# Patient Record
Sex: FEMALE | Race: BLACK OR AFRICAN AMERICAN | Employment: UNEMPLOYED | URBAN - METROPOLITAN AREA
[De-identification: names, ages, dates, MRNs, and addresses within clinical notes are randomized per-mention and may not be internally consistent; named-entity substitution may affect disease eponyms.]

---

## 2017-12-09 ENCOUNTER — HOSPITAL ENCOUNTER (OUTPATIENT)
Dept: MRI IMAGING | Age: 52
Discharge: HOME | End: 2017-12-09
Attending: SPECIALIST
Payer: MEDICAID

## 2017-12-09 DIAGNOSIS — D73.89 SPLENIC SEQUESTRATION: ICD-10-CM

## 2017-12-09 DIAGNOSIS — Z90.89: ICD-10-CM

## 2017-12-09 PROCEDURE — 74011250636 HC RX REV CODE- 250/636: Performed by: SPECIALIST

## 2017-12-09 PROCEDURE — 74183 MRI ABD W/O CNTR FLWD CNTR: CPT

## 2017-12-09 PROCEDURE — A9585 GADOBUTROL INJECTION: HCPCS | Performed by: SPECIALIST

## 2017-12-09 RX ADMIN — GADOBUTROL 7.5 ML: 604.72 INJECTION INTRAVENOUS at 10:28

## 2018-03-14 ENCOUNTER — HOSPITAL ENCOUNTER (OUTPATIENT)
Dept: LAB | Age: 53
Discharge: HOME | End: 2018-03-14
Payer: COMMERCIAL

## 2018-03-14 ENCOUNTER — OFFICE VISIT (OUTPATIENT)
Dept: HEMATOLOGY | Age: 53
End: 2018-03-14

## 2018-03-14 VITALS
DIASTOLIC BLOOD PRESSURE: 77 MMHG | TEMPERATURE: 98 F | BODY MASS INDEX: 32.14 KG/M2 | HEART RATE: 82 BPM | HEIGHT: 66 IN | WEIGHT: 200 LBS | SYSTOLIC BLOOD PRESSURE: 120 MMHG | RESPIRATION RATE: 16 BRPM | OXYGEN SATURATION: 99 %

## 2018-03-14 DIAGNOSIS — K76.89 LIVER CYST: ICD-10-CM

## 2018-03-14 DIAGNOSIS — K76.89 LIVER CYST: Primary | ICD-10-CM

## 2018-03-14 LAB
ALBUMIN SERPL-MCNC: 3.5 G/DL (ref 3.4–5)
ALBUMIN/GLOB SERPL: 0.9 {RATIO} (ref 0.8–1.7)
ALP SERPL-CCNC: 104 U/L (ref 45–117)
ALT SERPL-CCNC: 19 U/L (ref 13–56)
ANION GAP SERPL CALC-SCNC: 8 MMOL/L (ref 3–18)
AST SERPL-CCNC: 15 U/L (ref 15–37)
BASOPHILS # BLD: 0.1 K/UL (ref 0–0.06)
BASOPHILS NFR BLD: 1 % (ref 0–2)
BILIRUB DIRECT SERPL-MCNC: 0.1 MG/DL (ref 0–0.2)
BILIRUB SERPL-MCNC: 0.3 MG/DL (ref 0.2–1)
BUN SERPL-MCNC: 11 MG/DL (ref 7–18)
BUN/CREAT SERPL: 13 (ref 12–20)
CALCIUM SERPL-MCNC: 9.2 MG/DL (ref 8.5–10.1)
CHLORIDE SERPL-SCNC: 107 MMOL/L (ref 100–108)
CO2 SERPL-SCNC: 30 MMOL/L (ref 21–32)
CREAT SERPL-MCNC: 0.86 MG/DL (ref 0.6–1.3)
DIFFERENTIAL METHOD BLD: ABNORMAL
EOSINOPHIL # BLD: 0.1 K/UL (ref 0–0.4)
EOSINOPHIL NFR BLD: 1 % (ref 0–5)
ERYTHROCYTE [DISTWIDTH] IN BLOOD BY AUTOMATED COUNT: 13.5 % (ref 11.6–14.5)
GLOBULIN SER CALC-MCNC: 3.8 G/DL (ref 2–4)
GLUCOSE SERPL-MCNC: 93 MG/DL (ref 74–99)
HCT VFR BLD AUTO: 40.3 % (ref 35–45)
HGB BLD-MCNC: 12.9 G/DL (ref 12–16)
INR PPP: 1 (ref 0.8–1.2)
LYMPHOCYTES # BLD: 2.9 K/UL (ref 0.9–3.6)
LYMPHOCYTES NFR BLD: 36 % (ref 21–52)
MCH RBC QN AUTO: 28.3 PG (ref 24–34)
MCHC RBC AUTO-ENTMCNC: 32 G/DL (ref 31–37)
MCV RBC AUTO: 88.4 FL (ref 74–97)
MONOCYTES # BLD: 0.3 K/UL (ref 0.05–1.2)
MONOCYTES NFR BLD: 4 % (ref 3–10)
NEUTS SEG # BLD: 4.5 K/UL (ref 1.8–8)
NEUTS SEG NFR BLD: 58 % (ref 40–73)
PLATELET # BLD AUTO: 278 K/UL (ref 135–420)
PMV BLD AUTO: 9.7 FL (ref 9.2–11.8)
POTASSIUM SERPL-SCNC: 4.4 MMOL/L (ref 3.5–5.5)
PROT SERPL-MCNC: 7.3 G/DL (ref 6.4–8.2)
PROTHROMBIN TIME: 12.6 SEC (ref 11.5–15.2)
RBC # BLD AUTO: 4.56 M/UL (ref 4.2–5.3)
SODIUM SERPL-SCNC: 145 MMOL/L (ref 136–145)
WBC # BLD AUTO: 7.8 K/UL (ref 4.6–13.2)

## 2018-03-14 PROCEDURE — 87340 HEPATITIS B SURFACE AG IA: CPT | Performed by: INTERNAL MEDICINE

## 2018-03-14 PROCEDURE — 86706 HEP B SURFACE ANTIBODY: CPT | Performed by: INTERNAL MEDICINE

## 2018-03-14 PROCEDURE — 80076 HEPATIC FUNCTION PANEL: CPT | Performed by: INTERNAL MEDICINE

## 2018-03-14 PROCEDURE — 36415 COLL VENOUS BLD VENIPUNCTURE: CPT | Performed by: INTERNAL MEDICINE

## 2018-03-14 PROCEDURE — 85025 COMPLETE CBC W/AUTO DIFF WBC: CPT | Performed by: INTERNAL MEDICINE

## 2018-03-14 PROCEDURE — 85610 PROTHROMBIN TIME: CPT | Performed by: INTERNAL MEDICINE

## 2018-03-14 PROCEDURE — 86708 HEPATITIS A ANTIBODY: CPT | Performed by: INTERNAL MEDICINE

## 2018-03-14 PROCEDURE — 80048 BASIC METABOLIC PNL TOTAL CA: CPT | Performed by: INTERNAL MEDICINE

## 2018-03-14 PROCEDURE — 86803 HEPATITIS C AB TEST: CPT | Performed by: INTERNAL MEDICINE

## 2018-03-14 PROCEDURE — 86301 IMMUNOASSAY TUMOR CA 19-9: CPT | Performed by: INTERNAL MEDICINE

## 2018-03-14 PROCEDURE — 82378 CARCINOEMBRYONIC ANTIGEN: CPT | Performed by: INTERNAL MEDICINE

## 2018-03-14 PROCEDURE — 82107 ALPHA-FETOPROTEIN L3: CPT | Performed by: INTERNAL MEDICINE

## 2018-03-14 PROCEDURE — 86704 HEP B CORE ANTIBODY TOTAL: CPT | Performed by: INTERNAL MEDICINE

## 2018-03-14 RX ORDER — FLUOXETINE 20 MG/1
40 TABLET ORAL
COMMUNITY
End: 2018-07-05

## 2018-03-14 RX ORDER — AMITRIPTYLINE HYDROCHLORIDE 100 MG/1
100 TABLET, FILM COATED ORAL
COMMUNITY
Start: 2018-01-29 | End: 2018-07-05

## 2018-03-14 RX ORDER — NEOMYCIN SULFATE, POLYMYXIN B SULFATE AND DEXAMETHASONE 3.5; 10000; 1 MG/ML; [USP'U]/ML; MG/ML
1 SUSPENSION/ DROPS OPHTHALMIC
COMMUNITY
Start: 2018-01-11 | End: 2018-05-04

## 2018-03-14 RX ORDER — DICLOFENAC SODIUM 75 MG/1
75 TABLET, DELAYED RELEASE ORAL DAILY
COMMUNITY
Start: 2018-03-10 | End: 2022-04-14

## 2018-03-14 RX ORDER — PROPRANOLOL HYDROCHLORIDE 20 MG/1
20 TABLET ORAL
COMMUNITY
Start: 2018-01-30 | End: 2018-05-04

## 2018-03-14 RX ORDER — FLUTICASONE PROPIONATE 50 MCG
2 SPRAY, SUSPENSION (ML) NASAL AS NEEDED
COMMUNITY
Start: 2018-01-22 | End: 2019-01-22

## 2018-03-14 RX ORDER — CYCLOSPORINE 0.5 MG/ML
1 EMULSION OPHTHALMIC DAILY
COMMUNITY
Start: 2018-01-11 | End: 2019-01-11

## 2018-03-14 RX ORDER — FLUOXETINE HYDROCHLORIDE 40 MG/1
40 CAPSULE ORAL
COMMUNITY
Start: 2018-01-25 | End: 2018-03-26

## 2018-03-14 RX ORDER — AMITRIPTYLINE HYDROCHLORIDE 50 MG/1
100 TABLET, FILM COATED ORAL
COMMUNITY
End: 2018-05-04

## 2018-03-14 RX ORDER — SOLIFENACIN SUCCINATE 10 MG/1
5 TABLET, FILM COATED ORAL DAILY
COMMUNITY
End: 2018-05-04

## 2018-03-14 NOTE — PROGRESS NOTES
Dayan Gutierres is a 45 y.o. female    Chief Complaint   Patient presents with    New Patient         1. Have you been to the ER, urgent care clinic or hospitalized since your last visit? NO.     2. Have you seen or consulted any other health care providers outside of the [address] [place] [address] since your last visit (Include any pap smears or colon screening)?  NO  New patient      Learning Assessment 3/14/2018   PRIMARY LEARNER Patient   BARRIERS PRIMARY LEARNER NONE   CO-LEARNER CAREGIVER No   PRIMARY LANGUAGE ENGLISH   LEARNER PREFERENCE PRIMARY LISTENING   ANSWERED BY patient   RELATIONSHIP SELF

## 2018-03-14 NOTE — MR AVS SNAPSHOT
[address] [address] 
[phone] Patient: Ferne Skiff MRN: [id number] OVP:[id number] Visit Information Date & Time Provider Department Dept. Phone Encounter #  
 3/14/2018 10:00 AM Foster Shone, MD [address] [id number] Follow-up Instructions Return in about 4 weeks (around 4/11/2018) for MLS. Upcoming Health Maintenance Date Due Hepatitis C Screening 1965 DTaP/Tdap/Td series (1 - Tdap) 6/15/1984 PAP AKA CERVICAL CYTOLOGY 6/15/1984 BREAST CANCER SCRN MAMMOGRAM 6/15/2013 FOBT Q 1 YEAR AGE 50-75 6/15/2013 Influenza Age 5 to Adult 8/1/2017 Allergies as of 3/14/2018  Review Complete On: 3/14/2018 By: Pk Man Severity Noted Reaction Type Reactions Caffeine  10/06/2016    Nausea Only Cedarwood    Other (comments) Not allergic to cedarwood Codeine  10/06/2016    Nausea Only Duloxetine  01/30/2018    Other (comments) Other reaction(s): gi distress Patient states it makes her really sick and causes her to vomit Pegademase Bovine  07/14/2017    Other (comments)  
 nausea Pine Nut  10/12/2015    Photosensitivity Pine tree Poractant Alcides  07/14/2017    Other (comments) Prednisone  03/29/2017    Other (comments) Other reaction(s): other/intolerance Pt states makes heart flutter and liver jump Local pain with steroid injection and some nausea Current Immunizations  Never Reviewed No immunizations on file. Not reviewed this visit You Were Diagnosed With   
  
 Codes Comments Liver cyst    -  Primary ICD-10-CM: K76.89 
ICD-9-CM: 573.8 Vitals BP Pulse Temp Resp Height(growth percentile) Weight(growth percentile) 120/77 (BP 1 Location: Left arm, BP Patient Position: Sitting) 82 98 °F (36.7 °C) (Oral) 16 5' 5.5\" (1.664 m) 200 lb (90.7 kg) SpO2 BMI OB Status Smoking Status 99% 32.78 kg/m2 Hysterectomy Never Smoker BMI and BSA Data Body Mass Index Body Surface Area 32.78 kg/m 2 2.05 m 2 Preferred Pharmacy Pharmacy Name Phone [facility]/PHARMACY #[id number]- [id number], [address] [place] [address] Your Updated Medication List  
  
   
This list is accurate as of 3/14/18 10:53 AM.  Always use your most recent med list.  
  
  
  
  
 * amitriptyline 50 mg tablet Commonly known as:  ELAVIL Take 100 mg by mouth. * amitriptyline 100 mg tablet Commonly known as:  ELAVIL Take 100 mg by mouth. diclofenac EC 75 mg EC tablet Commonly known as:  VOLTAREN Take 75 mg by mouth. * FLUoxetine 20 mg tablet Commonly known as:  PROzac  
40 mg.  
  
 * FLUoxetine 40 mg capsule Commonly known as:  PROzac Take 40 mg by mouth. fluticasone 50 mcg/actuation nasal spray Commonly known as:  Fabiene Gift 2 Sprays by Nasal route. HYDROcodone-acetaminophen 5-325 mg per tablet Commonly known as:  Clearnce Archie Take 1 Tab by mouth every four (4) hours as needed for Pain. Max Daily Amount: 6 Tabs. ibuprofen 600 mg tablet Commonly known as:  MOTRIN Take 1 Tab by mouth three (3) times daily. Indications: PAIN  
  
 MILK THISTLE EXTRACT PO Take  by mouth daily. * milnacipran 12.5 mg tablet Commonly known as:  Cory Gosselin 25 mg.  
  
 * Milnacipran 50 mg tablet Commonly known as:  Cory Gosselin Take 50 mg by mouth. neomycin-polymyxin-dexamethasone 3.5mg/mL-10,000 unit/mL-0.1 % ophthalmic suspension Commonly known as:  DEXACIDIN, MAXITROL  
1 Drop.  
  
 propranolol 20 mg tablet Commonly known as:  INDERAL  
20 mg. REFRESH CLASSIC (PF) OP  
1 Drop. RESTASIS 0.05 % ophthalmic emulsion Generic drug:  cycloSPORINE Apply 1 Drop to eye.  
  
 solifenacin 10 mg tablet Commonly known as:  Ivette Grier Take 5 mg by mouth daily. therapeutic multivitamin tablet Commonly known as:  [facility] Take 1 Tab by mouth daily. TURMERIC ROOT EXTRACT PO Take  by mouth daily. * Notice: This list has 6 medication(s) that are the same as other medications prescribed for you. Read the directions carefully, and ask your doctor or other care provider to review them with you. Follow-up Instructions Return in about 4 weeks (around 4/11/2018) for MLS. To-Do List   
 03/14/2018 Lab:  AFP WITH AFP-L3%   
  
 03/14/2018 Lab:  CANCER AG 19-9   
  
 03/14/2018 Lab:  CBC WITH AUTOMATED DIFF   
  
 03/14/2018 Lab:  CEA   
  
 03/14/2018 Lab:  HCV AB W/REFLEX VERIFICATION   
  
 03/14/2018 Lab:  HEP A AB, TOTAL   
  
 03/14/2018 Lab:  HEP B SURFACE AB   
  
 03/14/2018 Lab:  HEP B SURFACE AG   
  
 03/14/2018 Lab:  HEPATIC FUNCTION PANEL   
  
 03/14/2018 Lab:  HEPATITIS B CORE AB, TOTAL   
  
 03/14/2018 Lab:  METABOLIC PANEL, BASIC   
  
 03/14/2018 Lab:  PROTHROMBIN TIME + INR Introducing [facility] SERVICES! Dear Alberta Mcgowan: Thank you for requesting a [email or web address] account. Our records indicate that you already have an active [email or web address] account. You can access your account anytime at [email or web address] Did you know that you can access your hospital and ER discharge instructions at any time in [email or web address]? You can also review all of your test results from your hospital stay or ER visit. Additional Information If you have questions, please visit the Frequently Asked Questions section of the [email or web address] website at [email or web address] Remember, [email or web address] is NOT to be used for urgent needs. For medical emergencies, dial 911. Now available from your iPhone and Android! Please provide this summary of care documentation to your next provider. Your primary care clinician is listed as Nadeen Presser.  If you have any questions after today's visit, please call [phone].

## 2018-03-15 LAB
AFP L3 MFR SERPL: NORMAL % (ref 0–9.9)
AFP SERPL-MCNC: 1.7 NG/ML (ref 0–8)
CANCER AG19-9 SERPL-ACNC: 14 U/ML (ref 0–35)
CEA SERPL-MCNC: <0.5 NG/ML
COMMENT, 144067: NORMAL
HAV AB SER QL IA: NEGATIVE
HBV CORE AB SERPL QL IA: NEGATIVE
HBV SURFACE AB SER QL IA: NEGATIVE
HBV SURFACE AB SERPL IA-ACNC: <3.1 MIU/ML
HBV SURFACE AG SER QL: 0.1 INDEX
HBV SURFACE AG SER QL: NEGATIVE
HCV AB S/CO SERPL IA: 0.2 S/CO RATIO (ref 0–0.9)
HEP BS AB COMMENT,HBSAC: ABNORMAL

## 2018-03-18 PROBLEM — Z90.710 S/P TAH (TOTAL ABDOMINAL HYSTERECTOMY): Status: ACTIVE | Noted: 2018-03-18

## 2018-03-18 PROBLEM — G43.109 MIGRAINE WITH VERTIGO: Status: ACTIVE | Noted: 2018-03-18

## 2018-03-18 PROBLEM — F32.A ANXIETY AND DEPRESSION: Status: ACTIVE | Noted: 2018-03-18

## 2018-03-18 PROBLEM — G89.4 CHRONIC PAIN SYNDROME: Status: ACTIVE | Noted: 2018-03-18

## 2018-03-18 PROBLEM — M79.7 FIBROMYALGIA: Status: ACTIVE | Noted: 2018-03-18

## 2018-03-18 PROBLEM — F41.9 ANXIETY AND DEPRESSION: Status: ACTIVE | Noted: 2018-03-18

## 2018-03-18 PROBLEM — G43.909 MIGRAINE HEADACHE: Status: ACTIVE | Noted: 2018-03-18

## 2018-03-18 PROBLEM — K76.89 LIVER CYST: Status: ACTIVE | Noted: 2018-03-18

## 2018-04-03 ENCOUNTER — HOSPITAL ENCOUNTER (OUTPATIENT)
Dept: ULTRASOUND IMAGING | Age: 53
Discharge: HOME | End: 2018-04-03
Attending: OBSTETRICS & GYNECOLOGY
Payer: MEDICAID

## 2018-04-03 DIAGNOSIS — R10.9 FLANK PAIN: ICD-10-CM

## 2018-04-03 PROCEDURE — 76770 US EXAM ABDO BACK WALL COMP: CPT

## 2018-04-16 ENCOUNTER — OFFICE VISIT (OUTPATIENT)
Dept: HEMATOLOGY | Age: 53
End: 2018-04-16

## 2018-04-16 VITALS
BODY MASS INDEX: 32.95 KG/M2 | HEART RATE: 91 BPM | HEIGHT: 66 IN | OXYGEN SATURATION: 97 % | TEMPERATURE: 98.9 F | WEIGHT: 205 LBS | SYSTOLIC BLOOD PRESSURE: 127 MMHG | DIASTOLIC BLOOD PRESSURE: 79 MMHG | RESPIRATION RATE: 16 BRPM

## 2018-04-16 DIAGNOSIS — K76.89 LIVER CYST: Primary | ICD-10-CM

## 2018-04-16 NOTE — PROGRESS NOTES
Keven Soriano is a 45 y.o. female    No chief complaint on file. 1. Have you been to the ER, urgent care clinic or hospitalized since your last visit? NO.     2. Have you seen or consulted any other health care providers outside of the [address] [place] [address] since your last visit (Include any pap smears or colon screening)? YES    Patient went to [facility] for PCP and pain specialist visits since last visit.     Learning Assessment 3/14/2018   PRIMARY LEARNER Patient   BARRIERS PRIMARY LEARNER NONE   CO-LEARNER CAREGIVER No   PRIMARY LANGUAGE ENGLISH   LEARNER PREFERENCE PRIMARY LISTENING   ANSWERED BY patient   RELATIONSHIP SELF

## 2018-04-16 NOTE — MR AVS SNAPSHOT
Srinath Jeffrey 
 
 
 [address] [address] [address] [address] 
[phone] Patient: Fer Yanes MRN: [id number] PVB:[id number]/[id number] Visit Information Date & Time Provider Department Dept. Phone Encounter #  
 4/16/2018 12:00 PM Alis Chavira MD [address] [place] [address]  [address] [id number] Upcoming Health Maintenance Date Due DTaP/Tdap/Td series (1 - Tdap) 6/15/1984 PAP AKA CERVICAL CYTOLOGY 6/15/1984 BREAST CANCER SCRN MAMMOGRAM 6/15/2013 FOBT Q 1 YEAR AGE 50-75 6/15/2013 Influenza Age 5 to Adult 8/1/2017 Allergies as of 4/16/2018  Review Complete On: 4/16/2018 By: Renny Bryan Severity Noted Reaction Type Reactions Caffeine  10/06/2016    Nausea Only Cedarwood    Other (comments) Not allergic to cedarwood Codeine  10/06/2016    Nausea Only Duloxetine  01/30/2018    Other (comments) Other reaction(s): gi distress Patient states it makes her really sick and causes her to vomit Pegademase Bovine  07/14/2017    Other (comments)  
 nausea Pine Nut  10/12/2015    Photosensitivity Pine tree Poractant Alcides  07/14/2017    Other (comments) Prednisone  03/29/2017    Other (comments) Other reaction(s): other/intolerance Pt states makes heart flutter and liver jump Local pain with steroid injection and some nausea Current Immunizations  Never Reviewed No immunizations on file. Not reviewed this visit Vitals BP Pulse Temp Resp Height(growth percentile) 127/79 (BP 1 Location: Right arm, BP Patient Position: Sitting) 91 98.9 °F (37.2 °C) (Tympanic) 16 5' 5.5\" (1.664 m) Weight(growth percentile) SpO2 BMI OB Status Smoking Status 205 lb (93 kg) 97% 33.59 kg/m2 Hysterectomy Never Smoker BMI and BSA Data Body Mass Index Body Surface Area  
 33.59 kg/m 2 2.07 m 2 Preferred Pharmacy Pharmacy Name Phone [facility]/PHARMACY #[id number]- [id number] [facility], [address] [place] [address] Your Updated Medication List  
  
   
This list is accurate as of 4/16/18 12:59 PM.  Always use your most recent med list.  
  
  
  
  
 * amitriptyline 50 mg tablet Commonly known as:  ELAVIL Take 100 mg by mouth. * amitriptyline 100 mg tablet Commonly known as:  ELAVIL Take 100 mg by mouth. diclofenac EC 75 mg EC tablet Commonly known as:  VOLTAREN Take 75 mg by mouth. FLUoxetine 20 mg tablet Commonly known as:  PROzac  
40 mg.  
  
 fluticasone 50 mcg/actuation nasal spray Commonly known as:  Lee Ann Koyanagi 2 Sprays by Nasal route. HYDROcodone-acetaminophen 5-325 mg per tablet Commonly known as:  Wellington Dage Take 1 Tab by mouth every four (4) hours as needed for Pain. Max Daily Amount: 6 Tabs. ibuprofen 600 mg tablet Commonly known as:  MOTRIN Take 1 Tab by mouth three (3) times daily. Indications: PAIN  
  
 MILK THISTLE EXTRACT PO Take  by mouth daily. * milnacipran 12.5 mg tablet Commonly known as:  Felix Samir 25 mg.  
  
 * Milnacipran 50 mg tablet Commonly known as:  Felix Samir Take 50 mg by mouth. neomycin-polymyxin-dexamethasone 3.5mg/mL-10,000 unit/mL-0.1 % ophthalmic suspension Commonly known as:  DEXACIDIN, MAXITROL  
1 Drop.  
  
 propranolol 20 mg tablet Commonly known as:  INDERAL  
20 mg. REFRESH CLASSIC (PF) OP  
1 Drop. RESTASIS 0.05 % ophthalmic emulsion Generic drug:  cycloSPORINE Apply 1 Drop to eye.  
  
 solifenacin 10 mg tablet Commonly known as:  Towanda Guitar Take 5 mg by mouth daily. therapeutic multivitamin tablet Commonly known as:  [facility] Take 1 Tab by mouth daily. TURMERIC ROOT EXTRACT PO Take  by mouth daily. * Notice: This list has 4 medication(s) that are the same as other medications prescribed for you.  Read the directions carefully, and ask your doctor or other care provider to review them with you. Introducing [facility]! Dear Dori Shows: Thank you for requesting a [employer] account. Our records indicate that you already have an active Kaznachey account. You can access your account anytime at [email or web address] Did you know that you can access your hospital and ER discharge instructions at any time in Kaznachey? You can also review all of your test results from your hospital stay or ER visit. Additional Information If you have questions, please visit the Frequently Asked Questions section of the [employer] website at [email or web address] Remember, [employer] is NOT to be used for urgent needs. For medical emergencies, dial 911. Now available from your iPhone and Android! Please provide this summary of care documentation to your next provider. Your primary care clinician is listed as Yaneth Alcantara. If you have any questions after today's visit, please call [phone].

## 2018-04-16 NOTE — PROGRESS NOTES
70 Jigar Carmen MD, [address], [place] Rosi Lorenzo, FAASLD       April Juanita Vázquez, JENNIFER Garrett, GABY-BC   Jenny Arroyo, ZACHARIAH Dave [facility] 136    at [address], [address] Mark Reeves  22.    [phone]    FAX: [address] [place] [address]    at [address], [address]    [phone]    FAX: [phone]         Patient Care Team:  Allen Amor DO as PCP - General (Internal Medicine)  Sakina Lassiter MD as Physician (Gynecologic Oncology)  Odessa Valladares MD as Physician (Oncology)      Problem List  Date Reviewed: 3/18/2018          Codes Class Noted    Fibromyalgia ICD-10-CM: M79.7  ICD-9-CM: 729.1  3/18/2018        Chronic pain syndrome ICD-10-CM: G89.4  ICD-9-CM: 338.4  3/18/2018        Liver cyst ICD-10-CM: K76.89  ICD-9-CM: 573.8  3/18/2018        Anxiety and depression ICD-10-CM: F41.9, F32.9  ICD-9-CM: 300.00, 311  3/18/2018        Migraine with vertigo ICD-10-CM: G43.109  ICD-9-CM: 346.80, 780.4  3/18/2018        Migraine headache ICD-10-CM: Y04.100  ICD-9-CM: 346.90  3/18/2018        S/P IVY (total abdominal hysterectomy) ICD-10-CM: Z90.710  ICD-9-CM: V88.01  3/18/2018              Florian Tucker returns to the [address] for management of a single Liver cyst.  The active problem list, all pertinent past medical history, medications, radiologic findings and laboratory findings related to the liver disorder were reviewed with the patient. The patient is a 45 y.o. Black female who was found to have cysts in her liver after she developed RUQ pain. She was living in [place] at that time, was seen at [facility] and underwent right hepatic resection in 2004.       She says that about 6 months after the surgery the RUQ pain returned and imaging studies demonstrated that the cysts had returned. The most recent MRI was from [facility] in 1/2018. The patient brought the MRI disk  With her to this appointment. I have reviewed the images by myself and showed the patient the liver cysts. I have also discussed the findings with our radiologist.  The MRI demonstrates evidence of previous right hepatectomy. There are 3 larger cysts in liver measuring about 3-5 cm. There are also several other smaller cysts. There is a 7 cm cyst in the left kidney. There are 2 hemangiomas in the spleen. One measures 3.5 cm and the other 2.6 cm. There are also a few small cysts in the spleen. There are no cysts in the pancreas. The pain complains of pain in the RUQ. However she also complains of pain all over and says she generally feels horrible and cannot function normally. The most recent laboratory studies indicate that the liver transaminases are normal, ALP is normal, tests of hepatic synthetic and metabolic function are normal, and the platelet count is normal.    The patient notes fatigue, pain in the right side over the liver,     The patient has limitations in functional activities secondary to other medical problems that are not related to the liver disease. The patient has not experienced swelling of the abdomen, swelling of the lower extremities, hematemesis, hematochezia.       ALLERGIES  Allergies   Allergen Reactions    Caffeine Nausea Only    Cedarwood Other (comments)     Not allergic to cedarwood    Codeine Nausea Only    Duloxetine Other (comments)     Other reaction(s): gi distress  Patient states it makes her really sick and causes her to vomit    Pegademase Bovine Other (comments)     nausea    Pine Nut Photosensitivity     Pine tree    Poractant Alcides Other (comments)    Prednisone Other (comments)     Other reaction(s): other/intolerance  Pt states makes heart flutter and liver jump  Local pain with steroid injection and some nausea       MEDICATIONS  Current Outpatient Prescriptions   Medication Sig    amitriptyline (ELAVIL) 100 mg tablet Take 100 mg by mouth.  POLYVINYL ALCOHOL/POVIDONE/PF (REFRESH CLASSIC, PF, OP) 1 Drop.  FLUoxetine (PROZAC) 20 mg tablet 40 mg.    fluticasone (FLONASE) 50 mcg/actuation nasal spray 2 Sprays by Nasal route.  diclofenac EC (VOLTAREN) 75 mg EC tablet Take 75 mg by mouth.  milnacipran (SAVELLA) 12.5 mg tablet 25 mg.    Milnacipran (SAVELLA) 50 mg tablet Take 50 mg by mouth.  neomycin-polymyxin-dexamethasone (MAXITROL) ophthalmic suspension 1 Drop.  propranolol (INDERAL) 20 mg tablet 20 mg.    cycloSPORINE (RESTASIS) 0.05 % ophthalmic emulsion Apply 1 Drop to eye.  solifenacin (VESICARE) 10 mg tablet Take 5 mg by mouth daily.  ibuprofen (MOTRIN) 600 mg tablet Take 1 Tab by mouth three (3) times daily. Indications: PAIN    HYDROcodone-acetaminophen (NORCO) 5-325 mg per tablet Take 1 Tab by mouth every four (4) hours as needed for Pain. Max Daily Amount: 6 Tabs.  MILK THISTLE FRUIT EXTRACT (MILK THISTLE EXTRACT PO) Take  by mouth daily.  TURMERIC ROOT EXTRACT PO Take  by mouth daily.  therapeutic multivitamin (THERAGRAN) tablet Take 1 Tab by mouth daily.  amitriptyline (ELAVIL) 50 mg tablet Take 100 mg by mouth. No current facility-administered medications for this visit. SYSTEM REVIEW NOT RELATED TO LIVER DISEASE OR REVIEWED ABOVE:  Constitution systems: Negative for fever, chills, weight gain, weight loss. Eyes: Negative for visual changes. ENT: Negative for sore throat, painful swallowing. Respiratory: Negative for cough, hemoptysis, SOB. Cardiology: Negative for chest pain, palpitations. GI:  Negative for constipation or diarrhea. : Negative for urinary frequency, dysuria, hematuria, nocturia. Skin: Negative for rash. Hematology: Negative for easy bruising, blood clots. Musculo-skelatal: Negative for back pain, muscle pain, weakness. Neurologic: Negative for headaches, dizziness, vertigo, memory problems not related to HE. Psychology: Negative for anxiety, depression. FAMILY HISTORY:  The father  of COPD. The mother had cysts in her liver was on dialysis and  of liver disease. SOCIAL HISTORY:  The patient is . The patient has 1 child and 2 grandchildren. The patient has never used tobacco products. The patient has never consumed significant amounts of alcohol. The patient is currently receiving disability. PHYSICAL EXAMINATION:  Visit Vitals    /79 (BP 1 Location: Right arm, BP Patient Position: Sitting)    Pulse 91    Temp 98.9 °F (37.2 °C) (Tympanic)    Resp 16    Ht 5' 5.5\" (1.664 m)    Wt 205 lb (93 kg)    SpO2 97%    BMI 33.59 kg/m2     General: No acute distress. Eyes: Sclera anicteric. ENT: No oral lesions. Thyroid normal.  Nodes: No adenopathy. Skin: No spider angiomata. No jaundice. No palmar erythema. Respiratory: Lungs clear to auscultation. Cardiovascular: Regular heart rate. No murmurs. No JVD. Abdomen: Soft non-tender. Liver size normal to percussion/palpation. Spleen not palpable. No obvious ascites. Extremities: No edema. No muscle wasting. No gross arthritic changes. Neurologic: Alert and oriented. Cranial nerves grossly intact. No asterixis.     LABORATORY STUDIES:  [facility] [place] of [address] [place] [address] & Units 3/14/2018 2016   WBC 4.6 - 13.2 K/uL 7.8 8.1   ANC 1.8 - 8.0 K/UL 4.5 5.0   HGB 12.0 - 16.0 g/dL 12.9 12.4    - 420 K/uL 278 233   INR 0.8 - 1.2   1.0    AST 15 - 37 U/L 15 20   ALT 13 - 56 U/L 19 28   Alk Phos 45 - 117 U/L 104 86   Bili, Total 0.2 - 1.0 MG/DL 0.3 0.3   Bili, Direct 0.0 - 0.2 MG/DL 0.1    Albumin 3.4 - 5.0 g/dL 3.5 3.4   BUN 7.0 - 18 MG/DL 11 6 (L)   Creat 0.6 - 1.3 MG/DL 0.86 0.81   Na 136 - 145 mmol/L 145 142   K 3.5 - 5.5 mmol/L 4.4 4.4   Cl 100 - 108 mmol/L 107 106   CO2 21 - 32 mmol/L 30 31   Glucose 74 - 99 mg/dL 93 89   Magnesium 1.8 - 2.4 mg/dL       Cancer Screening Latest Ref Rng & Units 3/14/2018   AFP, Serum 0.0 - 8.0 ng/mL 1.7   AFP-L3% 0.0 - 9.9 % Comment   CA 19-9 0 - 35 U/mL 14   CEA ng/mL <0.5     SEROLOGIES:  Serologies Latest Ref Rng & Units 3/14/2018   Hep A Ab, Total NEGATIVE   NEGATIVE   Hep B Surface Ag <1.00 Index 0.1   Hep B Surface Ag Interp NEG   NEGATIVE   Hep B Core Ab, Total NEGATIVE   NEGATIVE   Hep B Surface Ab >10.0 mIU/mL <3.10 (L)   Hep B Surface Ab Interp POS   NEGATIVE (A)   Hep C Ab 0.0 - 0.9 s/co ratio 0.2     LIVER HISTOLOGY:  Not available or performed    ENDOSCOPIC PROCEDURES:  Not available or performed    RADIOLOGY:  1/2018. MRI abdomen. Evidence of right lobe resection. Cysts in the right and left lobe measuring 3-5 cm. Several smaller cysts. 2 hemangiomas in the spleen 3.5 and 2.6 cm. Several small cysts in the spleen. Left renal cyst 7 cm. No cysts in the pancreas. OTHER TESTING:  Not available or performed    ASSESSMENT AND PLAN:  Cystic liver disease which does not meet the criteria for polycystic liver. The cysts in the liver are small measuring only 3-5 cm and it is difficult to know if they are truly causing her RUQ pain. I have reviewed the images with radiology. The largest cyst in the left lobe would be difficult to aspirate and require the aspiration needle pass through the diaphram risking pneumothorax. The cysts are benign and not the cause of her diffuse body pain or chronic pain syndrome. I do not think her pain is coming from the liver cysts. I suspect she has fibromyalgia. Hepatic resection can be done for single liver cysts but multiple cysts generally recur following resection as the liver regenerates. Will perform laboratory testing to monitor liver function and degree of liver injury.   This will include BMP, hepatic panel, CBC with platelet count, INR. Liver transaminases are normal.  Alkaline phosphatase is normal.  Liver function is normal.  The platelet count is normal.      Laboratory studies and imaging suggest the patient has no evidence of liver disease. Will perform serologic and studies to assess for various causes of chronic liver disease. Serologic studies for viral hepatitis were negative. Cancer makrers were all negative. This included AFP. CA 19-9. CEA. There are no contraindications for the patient to take any medications that are necessary for treatment of other medical issues. Vaccination for viral hepatitis A and B is recommended since the patient has no serologic evidence of previous exposure or vaccination with immunity. All of the above issues were discussed with the patient. All questions were answered. The patient expressed a clear understanding of the above. No follow-up [facility] [place] is needed.       Bonifacio Thorne MD  [facility] [address] [place] [address], [address] [place] [address] [facility], [address] [place] [address] Street [address]  [phone]

## 2018-05-15 ENCOUNTER — HOSPITAL ENCOUNTER (OUTPATIENT)
Dept: PREADMISSION TESTING | Age: 53
Discharge: HOME | End: 2018-05-15
Payer: MEDICAID

## 2018-05-15 VITALS — HEIGHT: 66 IN | BODY MASS INDEX: 33.11 KG/M2 | WEIGHT: 206 LBS

## 2018-05-15 LAB
APPEARANCE UR: CLEAR
BILIRUB UR QL: NEGATIVE
COLOR UR: YELLOW
GLUCOSE UR STRIP.AUTO-MCNC: NEGATIVE MG/DL
HGB UR QL STRIP: NEGATIVE
KETONES UR QL STRIP.AUTO: NEGATIVE MG/DL
LEUKOCYTE ESTERASE UR QL STRIP.AUTO: NEGATIVE
NITRITE UR QL STRIP.AUTO: NEGATIVE
PH UR STRIP: 6.5 [PH] (ref 5–8)
PROT UR STRIP-MCNC: NEGATIVE MG/DL
SP GR UR REFRACTOMETRY: 1.02 (ref 1–1.03)
UROBILINOGEN UR QL STRIP.AUTO: 0.2 EU/DL (ref 0.2–1)

## 2018-05-15 PROCEDURE — 81003 URINALYSIS AUTO W/O SCOPE: CPT | Performed by: ORTHOPAEDIC SURGERY

## 2018-05-15 RX ORDER — SODIUM CHLORIDE, SODIUM LACTATE, POTASSIUM CHLORIDE, CALCIUM CHLORIDE 600; 310; 30; 20 MG/100ML; MG/100ML; MG/100ML; MG/100ML
125 INJECTION, SOLUTION INTRAVENOUS CONTINUOUS
Status: CANCELLED | OUTPATIENT
Start: 2018-05-15

## 2018-05-15 RX ORDER — CEFAZOLIN SODIUM/WATER 2 G/20 ML
2 SYRINGE (ML) INTRAVENOUS ONCE
Status: CANCELLED | OUTPATIENT
Start: 2018-05-15 | End: 2018-05-15

## 2018-05-15 NOTE — PERIOP NOTES
No sleep apnea. Pt has a partial upper plate but no other removable prosthetic devices. Care Fusion kit given to patient with instructions. Reviewed Sotero wipes. No family history of MH. Pt does not meet criteria for special populations. Labs and EKG in media.

## 2018-05-21 PROBLEM — M65.312 TRIGGER FINGER OF LEFT THUMB: Status: ACTIVE | Noted: 2018-05-21

## 2018-05-21 NOTE — DISCHARGE INSTRUCTIONS
DISCHARGE SUMMARY from Nurse    PATIENT INSTRUCTIONS:    After general anesthesia or intravenous sedation, for 24 hours or while taking prescription Narcotics:  · Limit your activities  · Do not drive and operate hazardous machinery  · Do not make important personal or business decisions  · Do  not drink alcoholic beverages  · If you have not urinated within 8 hours after discharge, please contact your surgeon on call. Report the following to your surgeon:  · Excessive pain, swelling, redness or odor of or around the surgical area  · Temperature over 100.5  · Nausea and vomiting lasting longer than 4 hours or if unable to take medications  · Any signs of decreased circulation or nerve impairment to extremity: change in color, persistent  numbness, tingling, coldness or increase pain  · Any questions    What to do at Home:  Madi Luciano IN 10 DAYS CALL FOR APPT    If you experience any of the following symptoms heavy bleeding, fevers, severe pain, circulation changes, please follow up with dr freitas    *  Please give a list of your current medications to your Primary Care Provider. *  Please update this list whenever your medications are discontinued, doses are      changed, or new medications (including over-the-counter products) are added. *  Please carry medication information at all times in case of emergency situations. These are general instructions for a healthy lifestyle:    No smoking/ No tobacco products/ Avoid exposure to second hand smoke  Surgeon General's Warning:  Quitting smoking now greatly reduces serious risk to your health.     Obesity, smoking, and sedentary lifestyle greatly increases your risk for illness    A healthy diet, regular physical exercise & weight monitoring are important for maintaining a healthy lifestyle    You may be retaining fluid if you have a history of heart failure or if you experience any of the following symptoms:  Weight gain of 3 pounds or more overnight or 5 pounds in a week, increased swelling in our hands or feet or shortness of breath while lying flat in bed. Please call your doctor as soon as you notice any of these symptoms; do not wait until your next office visit. Recognize signs and symptoms of STROKE:    F-face looks uneven    A-arms unable to move or move unevenly    S-speech slurred or non-existent    T-time-call 911 as soon as signs and symptoms begin-DO NOT go       Back to bed or wait to see if you get better-TIME IS BRAIN. Warning Signs of HEART ATTACK     Call 911 if you have these symptoms:   Chest discomfort. Most heart attacks involve discomfort in the center of the chest that lasts more than a few minutes, or that goes away and comes back. It can feel like uncomfortable pressure, squeezing, fullness, or pain.  Discomfort in other areas of the upper body. Symptoms can include pain or discomfort in one or both arms, the back, neck, jaw, or stomach.  Shortness of breath with or without chest discomfort.  Other signs may include breaking out in a cold sweat, nausea, or lightheadedness. Don't wait more than five minutes to call 911 - MINUTES MATTER! Fast action can save your life. Calling 911 is almost always the fastest way to get lifesaving treatment. Emergency Medical Services staff can begin treatment when they arrive -- up to an hour sooner than if someone gets to the hospital by car. The discharge information has been reviewed with the patient and caregiver. The patient and caregiver verbalized understanding. Discharge medications reviewed with the patient and caregiver and appropriate educational materials and side effects teaching were provided. ___________________________________________________________________________________________________________________________________[facility]  Dr. Halle Madrid    Diet:  1.  Begin with liquids and light foods such as Jell-O and soups.  2. Advance as tolerated to your regular diet if not nauseated. First 24 hours:  1. Be in the care of a responsible adult. 2. Do not drive or operate machinery. 3. Do not drink alcoholic beverages. Activities:  1. Elevate the operative hand and ice for the next 48 hours; 20 minutes on / 20 minutes off  2.. Return to work depends on your type of employment. 3.  Follow-up with Dr. Danielle Dodd in 7-10 days post-operatively. Wound Care:  1. Maintain your postoperative dressing. Loosen the ACE wrap if swelling of the fingers occurs. 2. Remove your surgical dressing on the third post op day. Cover the wounds with Band-Aids. 3. Keep the surgical incisions dry until your sutures are removed when you see your doctor. Use a plastic bag with rubber bands to cover the limb during showers. Immersing the limb in water is to be avoided. Medications:  1. Strong oral narcotic pain medications have been prescribed for the first few days. Use only as directed. No pain medication is capable of taking away all the pain. Taking your pills at regular intervals will give you the best chance of having less pain. 2. If you need a refill PLEASE PLAN AHEAD. Call our office during regular hours (8-5). 3. Do not combine with alcoholic beverages. 4. Be careful as you walk, climb stairs or drive as mild dizziness is not unusual.  5. Do not take medications that have not been prescribed by your surgeon. 6. You may switch to over the counter pain medication of your choice as you become more comfortable. WHEN TO CALL YOUR SURGEON:  1. Significant swelling or any new numbness in the limb that was operated on  2. Unrelenting pain  3. Fever or Chills  4. Redness around incisions  5. Color change in the fingers or hand  6. Continuous drainage or bleeding from wounds (a small amount of drainage is expected)  7. Any other worrisome condition    WHEN TO CALL YOUR REGULAR DOCTOR:  1.  Flare up of any of your regular medical conditions    WHEN TO CALL 911:  1. Chest Pain  2. Shortness of Breath  3. Any other acute serious condition    CALL THE OFFICE:   If you have severe pain unrelieved by the medications;   If you have a fever of 101.0°F or greater;    If you notice excessive swelling, redness, or persistent drainage from the incision or IV site; [facility] office number is [phone] from 8:00am to 5:00pm Monday through Friday. After 5:00pm, on weekends, or holidays, please leave a message with our answering service and the doctor on-call will get back to you shortly.     .  Patient armband removed and shredded

## 2018-05-21 NOTE — H&P
Patient Name:  Bertha Nash   YOB: 1965      Chief Complaint:  Bilateral knee pain and left thumb pain. History of Chief Complaint:  Ms. Gurdeep Burleson is a patient of Dr. Cheng Connors, who presents today for followup of ongoing bilateral knee pain and left thumb pain. She states that she did not go to physical therapy for her knees due to still dealing with some vertigo. She has been using Pennsaid on the knees with mild improvement from this, but at this time her biggest area of pain is in her left thumb. She continues to have triggering of the left thumb. She states she tried the Pennsaid without relief. She is unable to do cortisone shots due to her history of heart palpitations with them. At this time, she does want to discuss surgical options for the finger. Past Medical/Surgical History:    Disease/Disorder Type Date Side Surgery Date Side Comment       Appendectomy          Hysterectomy 1999         Hepatic resection 2004         Hysterectomy 2016       Allergies:    Ingredient Reaction Medication Name Comment   CODEINE      PREDNISONE      PEGADEMASE BOVINE      CORTISONE          Current Medications:    Medication Directions   Lyrica 50 mg capsule    Nexium 40 mg capsule,delayed release take 1 capsule by oral route  every day   amitriptyline 100 mg tablet    diclofenac sodium 75 mg tablet,delayed release    fluoxetine 40 mg capsule    Savella 50 mg tablet      Social History:    SMOKING  Status Tobacco Type Units Per Day Yrs Used   Never smoker        ALCOHOL  There is a history of alcohol use, consumed rarely.     Family History:    Disease Detail Family Member Age Cause of Death Comments   Family history of Arthritis       Family history of Diabetes       Family history of Heart disease       Family history of High blood pressure       Family history of Kidney disease       Family history of Stroke       Liver disease Mother  Y      Review of Systems:    Pertinent negatives include fever, fainting and gout. Vitals:  Date BP Pulse Temp (F) Resp. (per min.) Height (Total in.) Weight (lbs.) BMI   04/05/2018     65.00  32.78     Physical Examination:    Extremities: On evaluation of the left thumb, there is tenderness to palpation along the 1st [facility] and metacarpophalangeal joint and tenderness over the A1 pulley with a palpable nodule. The left thumb does have triggering with flexion of the thumb. On evaluation of both knees, range of motion produces crepitus and tenderness about the anterior medial aspect of the knees bilaterally. Assessment:     1. Left thumb trigger finger. 2.  Bilateral knee pain with underlying patellofemoral osteoarthritis. Recommendation: At this time, we did discuss surgical options for the left thumb including a trigger finger release. Risks and benefits discussed. Dr. Liana Verde discussed surgery with the patient. We will get her set up for a left thumb trigger finger release. She will follow up postoperatively within 7-10 days and call with any and all concerns.             JENNIFER Tinsley, DO

## 2018-05-22 ENCOUNTER — ANESTHESIA EVENT (OUTPATIENT)
Dept: SURGERY | Age: 53
End: 2018-05-22
Payer: MEDICAID

## 2018-05-25 ENCOUNTER — ANESTHESIA (OUTPATIENT)
Dept: SURGERY | Age: 53
End: 2018-05-25
Payer: MEDICAID

## 2018-05-25 ENCOUNTER — HOSPITAL ENCOUNTER (OUTPATIENT)
Age: 53
Setting detail: OUTPATIENT SURGERY
Discharge: HOME | End: 2018-05-25
Attending: ORTHOPAEDIC SURGERY | Admitting: ORTHOPAEDIC SURGERY
Payer: MEDICAID

## 2018-05-25 VITALS
HEART RATE: 71 BPM | OXYGEN SATURATION: 100 % | SYSTOLIC BLOOD PRESSURE: 131 MMHG | BODY MASS INDEX: 34.35 KG/M2 | HEIGHT: 65 IN | RESPIRATION RATE: 16 BRPM | WEIGHT: 206.19 LBS | TEMPERATURE: 97.5 F | DIASTOLIC BLOOD PRESSURE: 76 MMHG

## 2018-05-25 DIAGNOSIS — M65.312 TRIGGER FINGER OF LEFT THUMB: Primary | ICD-10-CM

## 2018-05-25 PROCEDURE — 77030020782 HC GWN BAIR PAWS FLX 3M -B: Performed by: ORTHOPAEDIC SURGERY

## 2018-05-25 PROCEDURE — 76010000154 HC OR TIME FIRST 0.5 HR: Performed by: ORTHOPAEDIC SURGERY

## 2018-05-25 PROCEDURE — 77030002916 HC SUT ETHLN J&J -A: Performed by: ORTHOPAEDIC SURGERY

## 2018-05-25 PROCEDURE — 77030032490 HC SLV COMPR SCD KNE COVD -B: Performed by: ORTHOPAEDIC SURGERY

## 2018-05-25 PROCEDURE — 74011250636 HC RX REV CODE- 250/636: Performed by: ORTHOPAEDIC SURGERY

## 2018-05-25 PROCEDURE — 76210000006 HC OR PH I REC 0.5 TO 1 HR: Performed by: ORTHOPAEDIC SURGERY

## 2018-05-25 PROCEDURE — 76210000020 HC REC RM PH II FIRST 0.5 HR: Performed by: ORTHOPAEDIC SURGERY

## 2018-05-25 PROCEDURE — 77030011640 HC PAD GRND REM COVD -A: Performed by: ORTHOPAEDIC SURGERY

## 2018-05-25 PROCEDURE — 74011000250 HC RX REV CODE- 250

## 2018-05-25 PROCEDURE — 77030012508 HC MSK AIRWY LMA AMBU -A: Performed by: ANESTHESIOLOGY

## 2018-05-25 PROCEDURE — 74011250636 HC RX REV CODE- 250/636

## 2018-05-25 PROCEDURE — 74011000250 HC RX REV CODE- 250: Performed by: ORTHOPAEDIC SURGERY

## 2018-05-25 PROCEDURE — 76060000031 HC ANESTHESIA FIRST 0.5 HR: Performed by: ORTHOPAEDIC SURGERY

## 2018-05-25 RX ORDER — HYDROCODONE BITARTRATE AND ACETAMINOPHEN 5; 325 MG/1; MG/1
1 TABLET ORAL
Qty: 30 TAB | Refills: 0 | Status: SHIPPED | OUTPATIENT
Start: 2018-05-25 | End: 2018-07-05

## 2018-05-25 RX ORDER — MIDAZOLAM HYDROCHLORIDE 1 MG/ML
INJECTION, SOLUTION INTRAMUSCULAR; INTRAVENOUS AS NEEDED
Status: DISCONTINUED | OUTPATIENT
Start: 2018-05-25 | End: 2018-05-25 | Stop reason: HOSPADM

## 2018-05-25 RX ORDER — SODIUM CHLORIDE 0.9 % (FLUSH) 0.9 %
5-10 SYRINGE (ML) INJECTION AS NEEDED
Status: DISCONTINUED | OUTPATIENT
Start: 2018-05-25 | End: 2018-05-25 | Stop reason: HOSPADM

## 2018-05-25 RX ORDER — HYDROMORPHONE HYDROCHLORIDE 1 MG/ML
0.5 INJECTION, SOLUTION INTRAMUSCULAR; INTRAVENOUS; SUBCUTANEOUS
Status: DISCONTINUED | OUTPATIENT
Start: 2018-05-25 | End: 2018-05-25 | Stop reason: HOSPADM

## 2018-05-25 RX ORDER — LIDOCAINE HYDROCHLORIDE 20 MG/ML
INJECTION, SOLUTION EPIDURAL; INFILTRATION; INTRACAUDAL; PERINEURAL AS NEEDED
Status: DISCONTINUED | OUTPATIENT
Start: 2018-05-25 | End: 2018-05-25 | Stop reason: HOSPADM

## 2018-05-25 RX ORDER — ONDANSETRON 2 MG/ML
INJECTION INTRAMUSCULAR; INTRAVENOUS AS NEEDED
Status: DISCONTINUED | OUTPATIENT
Start: 2018-05-25 | End: 2018-05-25 | Stop reason: HOSPADM

## 2018-05-25 RX ORDER — SODIUM CHLORIDE, SODIUM LACTATE, POTASSIUM CHLORIDE, CALCIUM CHLORIDE 600; 310; 30; 20 MG/100ML; MG/100ML; MG/100ML; MG/100ML
100 INJECTION, SOLUTION INTRAVENOUS CONTINUOUS
Status: DISCONTINUED | OUTPATIENT
Start: 2018-05-25 | End: 2018-05-25 | Stop reason: HOSPADM

## 2018-05-25 RX ORDER — SODIUM CHLORIDE, SODIUM LACTATE, POTASSIUM CHLORIDE, CALCIUM CHLORIDE 600; 310; 30; 20 MG/100ML; MG/100ML; MG/100ML; MG/100ML
125 INJECTION, SOLUTION INTRAVENOUS CONTINUOUS
Status: DISCONTINUED | OUTPATIENT
Start: 2018-05-25 | End: 2018-05-25 | Stop reason: HOSPADM

## 2018-05-25 RX ORDER — ONDANSETRON 2 MG/ML
4 INJECTION INTRAMUSCULAR; INTRAVENOUS ONCE
Status: DISCONTINUED | OUTPATIENT
Start: 2018-05-25 | End: 2018-05-25 | Stop reason: HOSPADM

## 2018-05-25 RX ORDER — PROPOFOL 10 MG/ML
INJECTION, EMULSION INTRAVENOUS AS NEEDED
Status: DISCONTINUED | OUTPATIENT
Start: 2018-05-25 | End: 2018-05-25 | Stop reason: HOSPADM

## 2018-05-25 RX ORDER — FENTANYL CITRATE 50 UG/ML
INJECTION, SOLUTION INTRAMUSCULAR; INTRAVENOUS AS NEEDED
Status: DISCONTINUED | OUTPATIENT
Start: 2018-05-25 | End: 2018-05-25 | Stop reason: HOSPADM

## 2018-05-25 RX ORDER — DEXTROSE 50 % IN WATER (D50W) INTRAVENOUS SYRINGE
25-50 AS NEEDED
Status: DISCONTINUED | OUTPATIENT
Start: 2018-05-25 | End: 2018-05-25 | Stop reason: HOSPADM

## 2018-05-25 RX ORDER — CEFAZOLIN SODIUM/WATER 2 G/20 ML
2 SYRINGE (ML) INTRAVENOUS ONCE
Status: COMPLETED | OUTPATIENT
Start: 2018-05-25 | End: 2018-05-25

## 2018-05-25 RX ORDER — HYDROMORPHONE HYDROCHLORIDE 2 MG/ML
0.5 INJECTION, SOLUTION INTRAMUSCULAR; INTRAVENOUS; SUBCUTANEOUS
Status: DISCONTINUED | OUTPATIENT
Start: 2018-05-25 | End: 2018-05-25 | Stop reason: CLARIF

## 2018-05-25 RX ORDER — NALOXONE HYDROCHLORIDE 0.4 MG/ML
0.1 INJECTION, SOLUTION INTRAMUSCULAR; INTRAVENOUS; SUBCUTANEOUS AS NEEDED
Status: DISCONTINUED | OUTPATIENT
Start: 2018-05-25 | End: 2018-05-25 | Stop reason: HOSPADM

## 2018-05-25 RX ORDER — INSULIN LISPRO 100 [IU]/ML
INJECTION, SOLUTION INTRAVENOUS; SUBCUTANEOUS ONCE
Status: DISCONTINUED | OUTPATIENT
Start: 2018-05-25 | End: 2018-05-25 | Stop reason: HOSPADM

## 2018-05-25 RX ORDER — DEXAMETHASONE SODIUM PHOSPHATE 4 MG/ML
INJECTION, SOLUTION INTRA-ARTICULAR; INTRALESIONAL; INTRAMUSCULAR; INTRAVENOUS; SOFT TISSUE AS NEEDED
Status: DISCONTINUED | OUTPATIENT
Start: 2018-05-25 | End: 2018-05-25 | Stop reason: HOSPADM

## 2018-05-25 RX ORDER — MAGNESIUM SULFATE 100 %
4 CRYSTALS MISCELLANEOUS AS NEEDED
Status: DISCONTINUED | OUTPATIENT
Start: 2018-05-25 | End: 2018-05-25 | Stop reason: HOSPADM

## 2018-05-25 RX ADMIN — Medication 2 G: at 07:36

## 2018-05-25 RX ADMIN — MIDAZOLAM HYDROCHLORIDE 2 MG: 1 INJECTION, SOLUTION INTRAMUSCULAR; INTRAVENOUS at 07:28

## 2018-05-25 RX ADMIN — SODIUM CHLORIDE, SODIUM LACTATE, POTASSIUM CHLORIDE, AND CALCIUM CHLORIDE 125 ML/HR: 600; 310; 30; 20 INJECTION, SOLUTION INTRAVENOUS at 06:30

## 2018-05-25 RX ADMIN — FENTANYL CITRATE 50 MCG: 50 INJECTION, SOLUTION INTRAMUSCULAR; INTRAVENOUS at 07:49

## 2018-05-25 RX ADMIN — Medication 0.5 MG: at 08:26

## 2018-05-25 RX ADMIN — FENTANYL CITRATE 50 MCG: 50 INJECTION, SOLUTION INTRAMUSCULAR; INTRAVENOUS at 07:36

## 2018-05-25 RX ADMIN — LIDOCAINE HYDROCHLORIDE 60 MG: 20 INJECTION, SOLUTION EPIDURAL; INFILTRATION; INTRACAUDAL; PERINEURAL at 07:34

## 2018-05-25 RX ADMIN — SODIUM CHLORIDE, SODIUM LACTATE, POTASSIUM CHLORIDE, AND CALCIUM CHLORIDE 125 ML/HR: 600; 310; 30; 20 INJECTION, SOLUTION INTRAVENOUS at 08:44

## 2018-05-25 RX ADMIN — Medication 0.5 MG: at 08:37

## 2018-05-25 RX ADMIN — DEXAMETHASONE SODIUM PHOSPHATE 4 MG: 4 INJECTION, SOLUTION INTRA-ARTICULAR; INTRALESIONAL; INTRAMUSCULAR; INTRAVENOUS; SOFT TISSUE at 07:44

## 2018-05-25 RX ADMIN — ONDANSETRON 4 MG: 2 INJECTION INTRAMUSCULAR; INTRAVENOUS at 07:28

## 2018-05-25 RX ADMIN — PROPOFOL 200 MG: 10 INJECTION, EMULSION INTRAVENOUS at 07:34

## 2018-05-25 NOTE — BRIEF OP NOTE
BRIEF OPERATIVE NOTE    Date of Procedure: 5/25/2018   Preoperative Diagnosis: LEFT THUMB TRIGGER FINGER  Postoperative Diagnosis: LEFT THUMB TRIGGER FINGER    Procedure(s):  LEFT THUMB TRIGGER FINGER RELEASE   Surgeon(s) and Role:     * Nadeem Tellez DO - Primary         Surgical Assistant: cee    Surgical Staff:  Circ-1: Zulema Millan, RN  Scrub RN-1: Deb Harper RN  Surg Asst-1: Adiel Clubs  Event Time In   Incision Start 4315   Incision Close 0751     Anesthesia: General   Estimated Blood Loss: 5 ml  IVF 1000 ml  Specimens: * No specimens in log *   Findings: trigger thumb   Complications: none  Implants: * No implants in log *

## 2018-05-25 NOTE — ANESTHESIA POSTPROCEDURE EVALUATION
Post-Anesthesia Evaluation and Assessment    Cardiovascular Function/Vital Signs  Visit Vitals    /84 (BP 1 Location: Right arm, BP Patient Position: At rest)    Pulse 80    Temp 36.3 °C (97.4 °F)    Resp 18    Ht 5' 5\" (1.651 m)    Wt 93.5 kg (206 lb 3 oz)    SpO2 97%    BMI 34.31 kg/m2       Patient is status post Procedure(s):  LEFT THUMB TRIGGER FINGER RELEASE . Nausea/Vomiting: Controlled. Postoperative hydration reviewed and adequate. Pain:  Pain Scale 1: Numeric (0 - 10) (05/25/18 0845)  Pain Intensity 1: 3 (05/25/18 0845)   Managed. Neurological Status:   Neuro (WDL): Within Defined Limits (05/25/18 0840)   At baseline. Mental Status and Level of Consciousness: Arousable. Pulmonary Status:   O2 Device: Room air (05/25/18 0845)   Adequate oxygenation and airway patent. Complications related to anesthesia: None    Post-anesthesia assessment completed. No concerns. Patient has met all discharge requirements.     Signed By: Fanny Allan MD    May 25, 2018

## 2018-05-25 NOTE — PERIOP NOTES
Family has been updated. Pain well controled to left hand, fingers are mobile and less than 3 sec capillary refill. Dressing clean and dry.

## 2018-05-25 NOTE — INTERVAL H&P NOTE
H&P Update:  Whitney Abbott was seen and examined. History and physical has been reviewed. The patient has been examined. There have been no significant clinical changes since the completion of the originally dated History and Physical.  Patient identified by surgeon; surgical site was confirmed by patient and surgeon.   Plan for left trigger thumb release    Signed By: Sesar Nichols DO     May 25, 2018 7:14 AM

## 2018-05-25 NOTE — ANESTHESIA PREPROCEDURE EVALUATION
Anesthetic History     PONV          Review of Systems / Medical History  Patient summary reviewed, nursing notes reviewed and pertinent labs reviewed    Pulmonary        Sleep apnea: No treatment           Neuro/Psych   Within defined limits           Cardiovascular            Dysrhythmias (palpitations)            GI/Hepatic/Renal     GERD: well controlled    Renal disease (polycystic)  Liver disease     Endo/Other  Within defined limits           Other Findings            Physical Exam    Airway  Mallampati: II  TM Distance: 4 - 6 cm  Neck ROM: normal range of motion   Mouth opening: Normal     Cardiovascular  Regular rate and rhythm,  S1 and S2 normal,  no murmur, click, rub, or gallop  Rhythm: regular  Rate: normal         Dental    Dentition: Upper partial plate     Pulmonary  Breath sounds clear to auscultation               Abdominal  GI exam deferred       Other Findings            Anesthetic Plan    ASA: 2  Anesthesia type: general          Induction: Intravenous  Anesthetic plan and risks discussed with: Patient

## 2018-05-25 NOTE — OP NOTES
OPERATIVE NOTE    Patient: Kahlil Lawton MRN: [id number]  SSN: [id number]    YOB: 1965  Age: 45 y.o. Sex: female      Indications: This is a 45y.o. year-old female who presents with a left trigger finger. The patient was admitted for surgery as conservative measures have failed. Date of Procedure: 5/25/2018     Preoperative Diagnosis: LEFT THUMB TRIGGER FINGER    Postoperative Diagnosis: LEFT THUMB TRIGGER FINGER      Procedure: Procedure(s):  LEFT THUMB TRIGGER FINGER RELEASE     Surgeon: Chelsea Melgar DO    Anesthesia: general    Estimated Blood Loss: 5ml    Specimens: * No specimens in log *     Drains: none    Implants: * No implants in log *    Complications: None; patient tolerated the procedure well. Procedure: The patient was greeted by anesthesia and taken to the operative suite, where she underwent general endotracheal anesthesia. She was positioned in the supine position on a standard orthopedic table. The left arm was sterilely prepped and draped in a standard fashion. The arm was exsanguinated with an Esmarch bandage, which was utilized as a tourniquet. A 1 cm incision was made over the A1 pulley of the left hand 1 digit. Scissors utilized to expose the tendon sheath. The sheath was opened utilizing a fresh scalpel over the ulnar aspect. Scissors were subsequently utilized to open the A 1 pulley in its entirety, proximally and distally. Upon flexion and extension of the finger, there was no further triggering noted. The tissues were irrigated with 100ml of sterile saline. Utilizing Asepto syringe, the incision was reapproximated with 2-0 nylon suture in horizontal mattress fashion x2. The tissues had been anesthetized with 0.25% Marcaine without epinephrine, 5ml. A soft sterile dressing was placed including and ace wrap. The patient was recovered from anesthesia and was transferred to the post anesthesia care unit in stable condition.

## 2018-05-25 NOTE — IP AVS SNAPSHOT
[address] [address] 
[phone] Patient: Diana Hackett MRN: [id number] KHV:[id number] About your hospitalization You were admitted on:  May 25, 2018 You last received care in the:  [facility] PACU You were discharged on:  May 25, 2018 Why you were hospitalized Your primary diagnosis was:  Trigger Finger Of Left Thumb Follow-up Information Follow up With Details Comments Contact Info Sarita Robertson, [address] [facility] [address] [place] [address] 
[phone] Your Scheduled Appointments Thursday June 21, 2018 11:45 AM EDT  
ESTABLISHED PATIENT with Thalia Ibanez MD  
Urology of [address]) [address] [facility], [facility] 300 [address] [facility] [address]  
[phone] Discharge Orders None A check jarret indicates which time of day the medication should be taken. My Medications START taking these medications Instructions Each Dose to Equal  
 Morning Noon Evening Bedtime HYDROcodone-acetaminophen 5-325 mg per tablet Commonly known as:  Vivek Shepherd Your last dose was: Your next dose is: Take 1 Tab by mouth every four (4) hours as needed for Pain. Max Daily Amount: 6 Tabs. Indications: Pain 1 Tab CONTINUE taking these medications Instructions Each Dose to Equal  
 Morning Noon Evening Bedtime  
 amitriptyline 100 mg tablet Commonly known as:  ELAVIL Your last dose was: Your next dose is: Take 100 mg by mouth nightly. 100 mg  
    
   
   
   
  
 diclofenac EC 75 mg EC tablet Commonly known as:  VOLTAREN Your last dose was: Your next dose is: Take 75 mg by mouth daily. Indications: OSTEOARTHRITIS 75 mg FLUoxetine 20 mg tablet Commonly known as:  PROzac Your last dose was: Your next dose is:    
   
   
 40 mg.  
 40 mg  
    
   
   
   
  
 fluticasone 50 mcg/actuation nasal spray Commonly known as:  Maliha Duval Your last dose was: Your next dose is: 2 Sprays by Nasal route as needed. 2 Spray Milnacipran 50 mg tablet Commonly known as:  Nicole Arredondo Your last dose was: Your next dose is: Take 50 mg by mouth daily. Indications: FIBROMYALGIA 50 mg RESTASIS 0.05 % ophthalmic emulsion Generic drug:  cycloSPORINE Your last dose was: Your next dose is:    
   
   
 Administer 1 Drop to both eyes daily. 1 Drop Where to Get Your Medications Information on where to get these meds will be given to you by the nurse or doctor. ! Ask your nurse or doctor about these medications HYDROcodone-acetaminophen 5-325 mg per tablet Opioid Education Prescription Opioids: What You Need to Know: 
 
 
 
F-face looks uneven A-arms unable to move or move unevenly S-speech slurred or non-existent T-time-call 911 as soon as signs and symptoms begin-DO NOT go Back to bed or wait to see if you get better-TIME IS BRAIN. Warning Signs of HEART ATTACK Call 911 if you have these symptoms: 
? Chest discomfort.  Most heart attacks involve discomfort in the center of the chest that lasts more than a few minutes, or that goes away and comes back. It can feel like uncomfortable pressure, squeezing, fullness, or pain. ? Discomfort in other areas of the upper body. Symptoms can include pain or discomfort in one or both arms, the back, neck, jaw, or stomach. ? Shortness of breath with or without chest discomfort. ? Other signs may include breaking out in a cold sweat, nausea, or lightheadedness. Don't wait more than five minutes to call [address]! Fast action can save your life. Calling 911 is almost always the fastest way to get lifesaving treatment. Emergency Medical Services staff can begin treatment when they arrive  up to an hour sooner than if someone gets to the hospital by car. The discharge information has been reviewed with the patient and caregiver. The patient and caregiver verbalized understanding. Discharge medications reviewed with the patient and caregiver and appropriate educational materials and side effects teaching were provided. ___________________________________________________________________________________________________________________________________[facility] Dr. Teri Mcgraw Diet: 1. Begin with liquids and light foods such as Jell-O and soups. 2. Advance as tolerated to your regular diet if not nauseated. First 24 hours: 
1. Be in the care of a responsible adult. 2. Do not drive or operate machinery. 3. Do not drink alcoholic beverages. Activities: 1. Elevate the operative hand and ice for the next 48 hours; 20 minutes on / 20 minutes off 2.. Return to work depends on your type of employment. 3.  Follow-up with Dr. Prasanna Salas in 7-10 days post-operatively. Wound Care: 1. Maintain your postoperative dressing. Loosen the ACE wrap if swelling of the fingers occurs. 2. Remove your surgical dressing on the third post op day. Cover the wounds with Band-Aids. 3. Keep the surgical incisions dry until your sutures are removed when you see your doctor. Use a plastic bag with rubber bands to cover the limb during showers. Immersing the limb in water is to be avoided. Medications: 1. Strong oral narcotic pain medications have been prescribed for the first few days. Use only as directed. No pain medication is capable of taking away all the pain. Taking your pills at regular intervals will give you the best chance of having less pain. 2. If you need a refill PLEASE PLAN AHEAD. Call our office during regular hours (8-5). 3. Do not combine with alcoholic beverages. 4. Be careful as you walk, climb stairs or drive as mild dizziness is not unusual. 
5. Do not take medications that have not been prescribed by your surgeon. 6. You may switch to over the counter pain medication of your choice as you become more comfortable. WHEN TO CALL YOUR SURGEON: 
1. Significant swelling or any new numbness in the limb that was operated on 
2. Unrelenting pain 3. Fever or Chills 4. Redness around incisions 5. Color change in the fingers or hand 6. Continuous drainage or bleeding from wounds (a small amount of drainage is expected) 7. Any other worrisome condition WHEN TO CALL YOUR REGULAR DOCTOR: 
1. Flare up of any of your regular medical conditions WHEN TO CALL 911: 
1. Chest Pain 2. Shortness of Breath 3. Any other acute serious condition CALL THE OFFICE: 
? If you have severe pain unrelieved by the medications; 
? If you have a fever of 101.0°F or greater;  
? If you notice excessive swelling, redness, or persistent drainage from the incision or IV site; [facility] office number is [phone] from 8:00am to 5:00pm Monday through Friday. After 5:00pm, on weekends, or holidays, please leave a message with our answering service and the doctor on-call will get back to you shortly. . 
Patient armband removed and shredded Introducing hospitals & HEALTH SERVICES! Dear José Antonio Chowdary: Thank you for requesting a [employer] account. Our records indicate that you already have an active [employer] account. You can access your account anytime at [email or web address]. [employer] Did you know that you can access your hospital and ER discharge instructions at any time in [employer]? You can also review all of your test results from your hospital stay or ER visit. Additional Information If you have questions, please visit the Frequently Asked Questions section of the [employer] website at [email or web address] Remember, [employer] is NOT to be used for urgent needs. For medical emergencies, dial 911. Now available from your iPhone and Android! Introducing Felipe Li As a [employer] patient, I wanted to make you aware of our electronic visit tool called Felipe Li. [employer] 24/7 allows you to connect within minutes with a medical provider 24 hours a day, seven days a week via a mobile device or tablet or logging into a secure website from your computer. You can access Felipe Li from anywhere in the [place]. A virtual visit might be right for you when you have a simple condition and feel like you just dont want to get out of bed, or cant get away from work for an appointment, when your regular [employer] provider is not available (evenings, weekends or holidays), or when youre out of town and need minor care. Electronic visits cost only $49 and if the [employer] 24/7 provider determines a prescription is needed to treat your condition, one can be electronically transmitted to a nearby pharmacy*. Please take a moment to enroll today if you have not already done so. The enrollment process is free and takes just a few minutes. To enroll, please download the [employer] 24/7 kristy to your tablet or phone, or visit [email or web address] to enroll on your computer. And, as an [address] [place] [address] patient with a [employer] account, the results of your visits will be scanned into your electronic medical record and your primary care provider will be able to view the scanned results. We urge you to continue to see your regular Angeli Mcneil provider for your ongoing medical care. And while your primary care provider may not be the one available when you seek a Felipe Jordanfin virtual visit, the peace of mind you get from getting a real diagnosis real time can be priceless. For more information on Felipe Jordanfin, view our Frequently Asked Questions (FAQs) at [email or web address]. Sincerely, 
 
Alka Lou MD 
Chief Medical Officer [address] *:  certain medications cannot be prescribed via [employer] Providers Seen During Your Hospitalization Provider Specialty Primary office phone Geethagrupo Bia, [address] [facility] [phone] Your Primary Care Physician (PCP) Primary Care Physician Office Phone Office Fax 6[address] [facility], [address] [place] [address] [phone] You are allergic to the following Allergen Reactions Allergen Xt Tree [place] Pine Other (comments) Beef Containing Products Nausea Only Caffeine Nausea Only Cedarwood Other (comments) Not allergic to cedarwood Codeine Nausea Only Duloxetine Other (comments) Other reaction(s): gi distress Patient states it makes her really sick and causes her to vomit Pegademase Bovine Other (comments)  
 nausea Pine Nut Photosensitivity Pine tree Poractant Alcides Other (comments) Pork/Porcine Containing Products Nausea and Vomiting Prednisone Other (comments) Other reaction(s): other/intolerance Pt states makes heart flutter and liver jump Local pain with steroid injection and some nausea Recent Documentation Height Weight BMI OB Status Smoking Status 1.651 m 93.5 kg 34.31 kg/m2 Hysterectomy Never Smoker Emergency Contacts Name Discharge Info Relation Home Work Mobile Jeanine Beaver DISCHARGE CAREGIVER [3] Child [2]   [phone] Patient Belongings The following personal items are in your possession at time of discharge: 
  Dental Appliances: Partials, Uppers  Visual Aid: None      Home Medications: None   Jewelry: None  Clothing: Pants, Shirt, Undergarments, Footwear, Sweater (locker 7)    Other Valuables: Selena Hilliard (locker 7, cellphone with daughter) Please provide this summary of care documentation to your next provider. Signatures-by signing, you are acknowledging that this After Visit Summary has been reviewed with you and you have received a copy. Patient Signature:  ____________________________________________________________ Date:  ____________________________________________________________  
  
[facility] Provider Signature:  ____________________________________________________________ Date:  ____________________________________________________________

## 2018-08-08 PROBLEM — R93.89 GU ORGAN IMAGING ABNORMALITY: Status: ACTIVE | Noted: 2018-08-08

## 2018-08-08 PROBLEM — R33.9 INCOMPLETE BLADDER EMPTYING: Status: ACTIVE | Noted: 2018-08-08

## 2018-08-08 PROBLEM — N32.81 OAB (OVERACTIVE BLADDER): Status: ACTIVE | Noted: 2018-08-08

## 2018-08-08 PROBLEM — N39.46 MIXED INCONTINENCE: Status: ACTIVE | Noted: 2018-08-08

## 2018-08-08 PROBLEM — N28.1 RENAL CYST, LEFT: Status: ACTIVE | Noted: 2018-08-08

## 2018-08-09 PROBLEM — Z78.0 POST-MENOPAUSAL: Status: ACTIVE | Noted: 2018-08-09

## 2018-10-09 ENCOUNTER — HOSPITAL ENCOUNTER (OUTPATIENT)
Dept: LAB | Age: 53
Discharge: HOME | End: 2018-10-09
Payer: MEDICAID

## 2018-10-09 ENCOUNTER — OFFICE VISIT (OUTPATIENT)
Dept: HEMATOLOGY | Age: 53
End: 2018-10-09

## 2018-10-09 VITALS
HEART RATE: 90 BPM | OXYGEN SATURATION: 96 % | DIASTOLIC BLOOD PRESSURE: 84 MMHG | BODY MASS INDEX: 33.09 KG/M2 | HEIGHT: 65 IN | RESPIRATION RATE: 18 BRPM | SYSTOLIC BLOOD PRESSURE: 132 MMHG | WEIGHT: 198.6 LBS | TEMPERATURE: 98.9 F

## 2018-10-09 DIAGNOSIS — K76.89 LIVER CYST: ICD-10-CM

## 2018-10-09 DIAGNOSIS — Q44.6 POLYCYSTIC LIVER DISEASE: ICD-10-CM

## 2018-10-09 DIAGNOSIS — Q44.6 POLYCYSTIC LIVER DISEASE: Primary | ICD-10-CM

## 2018-10-09 LAB
ALBUMIN SERPL-MCNC: 3.6 G/DL (ref 3.4–5)
ALBUMIN/GLOB SERPL: 0.9 {RATIO} (ref 0.8–1.7)
ALP SERPL-CCNC: 111 U/L (ref 45–117)
ALT SERPL-CCNC: 22 U/L (ref 13–56)
ANION GAP SERPL CALC-SCNC: 9 MMOL/L (ref 3–18)
AST SERPL-CCNC: 15 U/L (ref 15–37)
BASOPHILS # BLD: 0 K/UL (ref 0–0.1)
BASOPHILS NFR BLD: 1 % (ref 0–2)
BILIRUB DIRECT SERPL-MCNC: 0.1 MG/DL (ref 0–0.2)
BILIRUB SERPL-MCNC: 0.4 MG/DL (ref 0.2–1)
BUN SERPL-MCNC: 9 MG/DL (ref 7–18)
BUN/CREAT SERPL: 12 (ref 12–20)
CALCIUM SERPL-MCNC: 8.8 MG/DL (ref 8.5–10.1)
CHLORIDE SERPL-SCNC: 105 MMOL/L (ref 100–108)
CO2 SERPL-SCNC: 27 MMOL/L (ref 21–32)
CREAT SERPL-MCNC: 0.73 MG/DL (ref 0.6–1.3)
DIFFERENTIAL METHOD BLD: NORMAL
EOSINOPHIL # BLD: 0.2 K/UL (ref 0–0.4)
EOSINOPHIL NFR BLD: 3 % (ref 0–5)
ERYTHROCYTE [DISTWIDTH] IN BLOOD BY AUTOMATED COUNT: 13.8 % (ref 11.6–14.5)
GLOBULIN SER CALC-MCNC: 3.8 G/DL (ref 2–4)
GLUCOSE SERPL-MCNC: 83 MG/DL (ref 74–99)
HCT VFR BLD AUTO: 40.9 % (ref 35–45)
HGB BLD-MCNC: 13 G/DL (ref 12–16)
LYMPHOCYTES # BLD: 2.8 K/UL (ref 0.9–3.6)
LYMPHOCYTES NFR BLD: 35 % (ref 21–52)
MCH RBC QN AUTO: 27.8 PG (ref 24–34)
MCHC RBC AUTO-ENTMCNC: 31.8 G/DL (ref 31–37)
MCV RBC AUTO: 87.6 FL (ref 74–97)
MONOCYTES # BLD: 0.3 K/UL (ref 0.05–1.2)
MONOCYTES NFR BLD: 4 % (ref 3–10)
NEUTS SEG # BLD: 4.6 K/UL (ref 1.8–8)
NEUTS SEG NFR BLD: 57 % (ref 40–73)
PLATELET # BLD AUTO: 257 K/UL (ref 135–420)
PMV BLD AUTO: 9.9 FL (ref 9.2–11.8)
POTASSIUM SERPL-SCNC: 4.2 MMOL/L (ref 3.5–5.5)
PROT SERPL-MCNC: 7.4 G/DL (ref 6.4–8.2)
RBC # BLD AUTO: 4.67 M/UL (ref 4.2–5.3)
SODIUM SERPL-SCNC: 141 MMOL/L (ref 136–145)
WBC # BLD AUTO: 8 K/UL (ref 4.6–13.2)

## 2018-10-09 PROCEDURE — 36415 COLL VENOUS BLD VENIPUNCTURE: CPT | Performed by: NURSE PRACTITIONER

## 2018-10-09 PROCEDURE — 80076 HEPATIC FUNCTION PANEL: CPT | Performed by: NURSE PRACTITIONER

## 2018-10-09 PROCEDURE — 80048 BASIC METABOLIC PNL TOTAL CA: CPT | Performed by: NURSE PRACTITIONER

## 2018-10-09 PROCEDURE — 85025 COMPLETE CBC W/AUTO DIFF WBC: CPT | Performed by: NURSE PRACTITIONER

## 2018-10-09 RX ORDER — ESOMEPRAZOLE MAGNESIUM 40 MG/1
CAPSULE, DELAYED RELEASE ORAL DAILY
COMMUNITY
End: 2022-05-26

## 2018-10-09 RX ORDER — CYANOCOBALAMIN (VITAMIN B-12) 500 MCG
TABLET ORAL DAILY
COMMUNITY
End: 2022-04-14

## 2018-10-09 NOTE — PROGRESS NOTES
[address] [place] [address] María David MD, Unruly Watts, [facility] ZACHARIAH Umanzor PA-C Mari Ancona, RODRIGUEZP-BC   ZACHARIAH Rao NP [facility] 
  at [place] [id number] [facility] Darlene, [address] [facility], [address] 
  [phone] FAX: [address] 
  [facility] 
  [address], [address] [facility], [address] 
  [phone] FAX: [phone] Patient Care Team: 
Ed Rodas DO as PCP - General (Internal Medicine) Shara Barry MD (Rheumatology) Corinna Francois MD (Neurology) Deng Sandra NP (Nurse Practitioner) Problem List  Date Reviewed: 10/6/2018 Codes Class Noted Post-menopausal, sp harriet bso last procedure 2006 ICD-10-CM: Z78.0 ICD-9-CM: V49.81  8/9/2018 OAB (overactive bladder) ICD-10-CM: N32.81 ICD-9-CM: 596.51  8/8/2018 Renal cyst, left (simple mri [id number]) ICD-10-CM: N28.1 ICD-9-CM: 753.10  8/8/2018 Hx of solid right renal lesion by US [id number], negative MR [id number] ICD-10-CM: R93.89 ICD-9-CM: 793.5  8/8/2018 Mixed incontinence ICD-10-CM: N39.46 
ICD-9-CM: 788.33  8/8/2018 Incomplete bladder emptying ICD-10-CM: R33.9 ICD-9-CM: 788.21  8/8/2018 Trigger finger of left thumb ICD-10-CM: A99.120 ICD-9-CM: 727.03  5/21/2018 Fibromyalgia ICD-10-CM: M79.7 ICD-9-CM: 729.1  3/18/2018 Chronic pain syndrome ICD-10-CM: G89.4 ICD-9-CM: 338.4  3/18/2018 Liver cyst ICD-10-CM: K76.89 
ICD-9-CM: 573.8  3/18/2018 Anxiety and depression ICD-10-CM: F41.9, F32.9 ICD-9-CM: 300.00, 311  3/18/2018 Migraine with vertigo ICD-10-CM: G43.109 ICD-9-CM: 346.80, 780.4  3/18/2018 Migraine headache ICD-10-CM: I60.623 ICD-9-CM: 346.90  3/18/2018 S/P IVY (total abdominal hysterectomy) ICD-10-CM: Z90.710 ICD-9-CM: V88.01  3/18/2018 Petros Givens returns to the [place] [address] for management of cystic liver disease. The active problem list, all pertinent past medical history, medications, radiologic findings and laboratory findings related to the liver disorder were reviewed with the patient. The patient is a 52 y.o. Black female who was found to have cysts in her liver after she developed RUQ pain. She was living in [place] at that time, was seen at [facility] and underwent right hepatic resection in 2004. She says that about 6 months after the surgery the RUQ pain returned and imaging studies demonstrated that the cysts had returned. The most recent MRI was from [place] in 1/2018. MLS reviewed the images and showed the patient the liver cysts. He also discussed the findings with our radiologist.  The MRI demonstrates evidence of previous right hepatectomy. There are 3 larger cysts in liver measuring about 3-5 cm. There are also several other smaller cysts. There is a 7 cm cyst in the left kidney. There are 2 hemangiomas in the spleen. One measures 3.5 cm and the other 2.6 cm. There are also a few small cysts in the spleen. There are no cysts in the pancreas. The patient is emotional, crying, and states that she was assured by Dr. Emigdio Echevarria that her issues are not related to her liver and that no follow up was necessary however patient states that other providers continue to dismiss her concerns. The most recent laboratory studies indicate that the liver transaminases are normal, ALP is normal, tests of hepatic synthetic and metabolic function are normal, and the platelet count is normal. 
 
The patient notes fatigue, pain in the right side over the liver, pain in joints and she also complains of pain all over and says she generally feels horrible and cannot function normally. The patient has not experienced swelling of the abdomen, swelling of the lower extremities, hematemesis, hematochezia. The patient has limitations in functional activities secondary to other medical problems that are not related to the liver disease. ALLERGIES Allergies Allergen Reactions  Flavoring Agent Palpitations [address] Other (comments)  Beef Containing Products Nausea Only and Myalgia  Caffeine Nausea Only  Cedarwood Other (comments) Not allergic to cedarwood  Chocolate Flavor Myalgia  Codeine Nausea Only  Duloxetine Other (comments) Other reaction(s): gi distress Patient states it makes her really sick and causes her to vomit  Hydrocodone Other (comments)  Pegademase Bovine Other (comments)  
  nausea  Pine Nut Photosensitivity Pine tree  Poractant Alcides Other (comments)  Pork/Porcine Containing Products Nausea and Vomiting  Prednisone Other (comments) and Myalgia Other reaction(s): other/intolerance Pt states makes heart flutter and liver jump Local pain with steroid injection and some nausea MEDICATIONS Current Outpatient Prescriptions Medication Sig  
 mirabegron ER (MYRBETRIQ) 25 mg ER tablet Take 25 mg by mouth daily.  calcium carb/vit D3/minerals (CALCIUM CARBONATE-VIT D3-MIN) 600 mg (1,500 mg)-400 unit chew Take  by mouth.  cholecalciferol (VITAMIN D3) 400 unit tab tablet Take  by mouth daily.  diclofenac (VOLTAREN) 1 % gel  gabapentin (NEURONTIN) 300 mg capsule  rizatriptan (MAXALT) 5 mg tablet Take one tablet at onset of headache. May repeat in 2 hours, if needed  FLUoxetine (PROZAC) 40 mg capsule  meloxicam (MOBIC) 15 mg tablet  Omeprazole delayed release (PRILOSEC D/R) 20 mg tablet 40 mg.  
 QUEtiapine (SEROQUEL) 50 mg tablet  fluticasone (FLONASE) 50 mcg/actuation nasal spray 2 Sprays by Nasal route as needed.  diclofenac EC (VOLTAREN) 75 mg EC tablet Take 75 mg by mouth daily. Indications: OSTEOARTHRITIS  cycloSPORINE (RESTASIS) 0.05 % ophthalmic emulsion Administer 1 Drop to both eyes daily. No current facility-administered medications for this visit. SYSTEM REVIEW NOT RELATED TO LIVER DISEASE OR REVIEWED ABOVE: 
Constitution systems: Negative for fever, chills, weight gain, weight loss. Eyes: Negative for visual changes. ENT: Negative for sore throat, painful swallowing. Respiratory: Negative for cough, hemoptysis, SOB. Cardiology: Negative for chest pain, palpitations. GI:  Negative for constipation or diarrhea. : Negative for urinary frequency, dysuria, hematuria, nocturia. Skin: Negative for rash. Hematology: Negative for easy bruising, blood clots. Musculo-skelatal: Negative for back pain, muscle pain, weakness. Neurologic: Negative for headaches, dizziness, vertigo, memory problems not related to HE. Psychology: Negative for anxiety, depression. FAMILY HISTORY: 
The father  of COPD. The mother had cysts in her liver was on dialysis and  of liver disease. SOCIAL HISTORY: 
The patient is . The patient has 1 child and 2 grandchildren. The patient has never used tobacco products. The patient has never consumed significant amounts of alcohol. The patient is currently receiving disability. PHYSICAL EXAMINATION: 
Visit Vitals  /84 (BP 1 Location: Right arm, BP Patient Position: Sitting)  Pulse 90  Temp 98.9 °F (37.2 °C) (Tympanic)  Resp 18  Ht 5' 5\" (1.651 m)  Wt 198 lb 9.6 oz (90.1 kg)  SpO2 96%  BMI 33.05 kg/m2 General: No acute distress. Eyes: Sclera anicteric. ENT: No oral lesions. Thyroid normal. 
Nodes: No adenopathy. Skin: No spider angiomata. No jaundice. No palmar erythema. Respiratory: Lungs clear to auscultation. Cardiovascular: Regular heart rate. No murmurs. No JVD. Abdomen: Soft non-tender. Liver size normal to percussion/palpation. Spleen not palpable. No obvious ascites. Extremities: No edema. No muscle wasting. No gross arthritic changes. Neurologic: Alert and oriented. Cranial nerves grossly intact. No asterixis. LABORATORY STUDIES: 
[place] [address] Units 10/9/2018 3/14/2018 WBC 4.6 - 13.2 K/uL 8.0 7.8 ANC 1.8 - 8.0 K/UL 4.6 4.5 HGB 12.0 - 16.0 g/dL 13.0 12.9  - 420 K/uL 257 278 INR 0.8 - 1.2    1.0 AST 15 - 37 U/L 15 15 ALT 13 - 56 U/L 22 19 Alk Phos 45 - 117 U/L 111 104 Bili, Total 0.2 - 1.0 MG/DL 0.4 0.3 Bili, Direct 0.0 - 0.2 MG/DL 0.1 0.1 Albumin 3.4 - 5.0 g/dL 3.6 3.5 BUN 7.0 - 18 MG/DL 9 11 Creat 0.6 - 1.3 MG/DL 0.73 0.86 Na 136 - 145 mmol/L 141 145  
K 3.5 - 5.5 mmol/L 4.2 4.4 Cl 100 - 108 mmol/L 105 107 CO2 21 - 32 mmol/L 27 30 Glucose 74 - 99 mg/dL 83 93 Magnesium 1.8 - 2.4 mg/dL Cancer Screening Latest Ref Rng & Units 3/14/2018 AFP, Serum 0.0 - 8.0 ng/mL 1.7 AFP-L3% 0.0 - 9.9 % Comment CA 19-9 0 - 35 U/mL 14 CEA ng/mL <0.5 SEROLOGIES: 
Serologies Latest Ref Rng & Units 3/14/2018 Hep A Ab, Total NEGATIVE   NEGATIVE Hep B Surface Ag <1.00 Index 0.1 Hep B Surface Ag Interp NEG   NEGATIVE Hep B Core Ab, Total NEGATIVE   NEGATIVE Hep B Surface Ab >10.0 mIU/mL <3.10 (L) Hep B Surface Ab Interp POS   NEGATIVE (A) Hep C Ab 0.0 - 0.9 s/co ratio 0.2 LIVER HISTOLOGY: 
Not available or performed ENDOSCOPIC PROCEDURES: 
Not available or performed RADIOLOGY: 
1/2018. MRI abdomen. Evidence of right lobe resection. Cysts in the right and left lobe measuring 3-5 cm. Several smaller cysts. 2 hemangiomas in the spleen 3.5 and 2.6 cm. Several small cysts in the spleen. Left renal cyst 7 cm. No cysts in the pancreas. OTHER TESTING: 
Not available or performed ASSESSMENT AND PLAN: 
Cystic liver disease which does not meet the criteria for polycystic liver. 
 
The cysts in the liver are small measuring only 3-5 cm and it is difficult to know if they are truly causing her RUQ pain. The largest cyst in the left lobe would be difficult to aspirate and require the aspiration needle pass through the diaphram risking pneumothorax. The cysts are benign and not the cause of her diffuse body pain or chronic pain syndrome. I do not think her pain is coming from the liver cysts. Suspect she has fibromyalgia. Hepatic resection can be done for single liver cysts but multiple cysts generally recur following resection as the liver regenerates. Have performed laboratory testing to monitor liver function and degree of liver injury. This included BMP, hepatic panel, CBC with platelet count. Liver transaminases are normal.  Alkaline phosphatase is normal.  Liver function is normal.  The platelet count is normal.   
 
Laboratory studies and imaging suggest the patient has no evidence of liver disease. Serologic studies for viral hepatitis were negative. Cancer makrers were all negative. This included AFP. CA 19-9. CEA. There are no contraindications for the patient to take any medications that are necessary for treatment of other medical issues. Vaccination for viral hepatitis A and B is recommended since the patient has no serologic evidence of previous exposure or vaccination with immunity. 45 minutes total spent with this patient with more than 50% of this time spent counseling and coordinating as described above. All of the above issues were discussed with the patient. All questions were answered. The patient expressed a clear understanding of the above. [address] [place] [address] in 4 weeks to review all data and determine the treatment plan. Kacy Marquez, AGPCNP-BC [address] [facility] [address] [place] [address], [address]  Kylie Henson, [address] 
[phone] [address] [facility] [address]

## 2018-10-09 NOTE — MR AVS SNAPSHOT
[address] [address] 
[phone] Patient: Myrtha Bernheim MRN: [id number] Visit Information Date & Time Provider Department Dept. Phone Encounter #  
 10/9/2018  9:30 AM ZACHARIAH Wellington [address] of  [address] [id number] Follow-up Instructions Return in about 5 weeks (around 11/14/2018) for [facility] . Upcoming Health Maintenance Date Due DTaP/Tdap/Td series (1 - Tdap) 6/15/1984 PAP AKA CERVICAL CYTOLOGY 6/15/1984 Shingrix Vaccine Age 50> (1 of 2) 6/15/2013 BREAST CANCER SCRN MAMMOGRAM 6/15/2013 FOBT Q 1 YEAR AGE 50-75 6/15/2013 Influenza Age 5 to Adult 8/1/2018 Allergies as of 10/9/2018  Review Complete On: 10/9/2018 By: Chantal Melendez Severity Noted Reaction Type Reactions Flavoring Agent Medium 07/26/2018    Palpitations Allergen Xt Tree Garrett-aust [facility]    Other (comments) Beef Containing Products    Nausea Only, Myalgia Caffeine  10/06/2016    Nausea Only Cedarwood    Other (comments) Not allergic to cedarwood Chocolate Flavor    Myalgia Codeine  10/06/2016    Nausea Only Duloxetine  01/30/2018    Other (comments) Other reaction(s): gi distress Patient states it makes her really sick and causes her to vomit Hydrocodone  10/09/2018    Other (comments) Pegademase Bovine  07/14/2017    Other (comments)  
 nausea Pine Nut  10/12/2015    Photosensitivity Pine tree Poractant Alcides  07/14/2017    Other (comments) Pork/porcine Containing Products  05/15/2018    Nausea and Vomiting Prednisone  03/29/2017    Other (comments), Myalgia Other reaction(s): other/intolerance Pt states makes heart flutter and liver jump Local pain with steroid injection and some nausea Current Immunizations  Never Reviewed No immunizations on file. Not reviewed this visit You Were Diagnosed With   
  
 Codes Comments Polycystic liver disease    -  Primary ICD-10-CM: Q44.6 ICD-9-CM: 751.62 Vitals BP Pulse Temp Resp Height(growth percentile) 132/84 (BP 1 Location: Right arm, BP Patient Position: Sitting) 90 98.9 °F (37.2 °C) (Tympanic) 18 5' 5\" (1.651 m) Weight(growth percentile) SpO2 BMI OB Status Smoking Status 198 lb 9.6 oz (90.1 kg) 96% 33.05 kg/m2 Hysterectomy Never Smoker Vitals History BMI and BSA Data Body Mass Index Body Surface Area 33.05 kg/m 2 2.03 m 2 Preferred Pharmacy Pharmacy Name Phone [facility] #[id number]- [phone] [facility], [address] [facility] Your Updated Medication List  
  
   
This list is accurate as of 10/9/18 10:46 AM.  Always use your most recent med list.  
  
  
  
  
 Calcium Carbonate-Vit D3-Min 600 mg (1,500 mg)-400 unit Chew Take  by mouth. cholecalciferol 400 unit Tab tablet Commonly known as:  VITAMIN D3 Take  by mouth daily. * diclofenac EC 75 mg EC tablet Commonly known as:  VOLTAREN Take 75 mg by mouth daily. Indications: OSTEOARTHRITIS  
  
 * diclofenac 1 % Gel Commonly known as:  VOLTAREN  
  
 FLUoxetine 40 mg capsule Commonly known as:  PROzac  
  
 fluticasone 50 mcg/actuation nasal spray Commonly known as:  Rhiannon Reyna 2 Sprays by Nasal route as needed. gabapentin 300 mg capsule Commonly known as:  NEURONTIN  
  
 meloxicam 15 mg tablet Commonly known as:  MOBIC NexIUM 40 mg capsule Generic drug:  esomeprazole Take  by mouth daily. QUEtiapine 50 mg tablet Commonly known as:  SEROquel RESTASIS 0.05 % ophthalmic emulsion Generic drug:  cycloSPORINE Administer 1 Drop to both eyes daily. rizatriptan 5 mg tablet Commonly known as:  Margarito Menendez Take one tablet at onset of headache. May repeat in 2 hours, if needed * Notice:   This list has 2 medication(s) that are the same as other medications prescribed for you. Read the directions carefully, and ask your doctor or other care provider to review them with you. Follow-up Instructions Return in about 5 weeks (around 11/14/2018) for [facility] . Introducing [facility]! Dear Marvin Casper: Thank you for requesting a [employer] account. Our records indicate that you already have an active [employer] account. You can access your account anytime at [email or web address] Did you know that you can access your hospital and ER discharge instructions at any time in [employer]? You can also review all of your test results from your hospital stay or ER visit. Additional Information If you have questions, please visit the Frequently Asked Questions section of the [employer] website at [email or web address] Remember, [employer] is NOT to be used for urgent needs. For medical emergencies, dial 911. Now available from your iPhone and Android! Please provide this summary of care documentation to your next provider. Your primary care clinician is listed as Alisa Pennington. If you have any questions after today's visit, please call [phone].

## 2018-10-12 NOTE — PROGRESS NOTES
SYSTEM REVIEW NOT RELATED TO LIVER DISEASE OR REVIEWED ABOVE: 
Constitution systems: occasional  fever, chills, weight gain, weight loss. Eyes: Negative for visual changes. ENT: Negative for sore throat, painful swallowing. Respiratory: + cough, hemoptysis, SOB. Cardiology: Negative for chest pain, palpitations. GI:  + constipation or diarrhea. : + urinary frequency, dysuria, hematuria, nocturia. Skin: Negative for rash. Hematology: + easy bruising, blood clots. Musculo-skelatal: + back pain, muscle pain, weakness. Neurologic: headaches, dizziness, vertigo, memory problems not related to HE. Psychology: + anxiety, + depression. Patient called to discuss system positives as well as concern regarding a CT scan she had in 2016 that was positive for \"multiple scattered 2 to 4 mm pulmonary nodules involving bilateral upper and lower lobes. Mild nonspecific, the appearance is suggestive of an underlying infectious or inflammatory process\". Does not appear this had further work-up as suggested and patient is questioning if this might be possible cause of her pain. Patient states she is following up with her PCP regarding + symptoms as listed above. Kacy Maruqez, AGPCNP-BC [address] [facility], [address] [place], [address] 
[phone] [address] [facility]

## 2018-11-20 ENCOUNTER — OFFICE VISIT (OUTPATIENT)
Dept: HEMATOLOGY | Age: 53
End: 2018-11-20

## 2018-11-20 VITALS
BODY MASS INDEX: 33.66 KG/M2 | HEART RATE: 77 BPM | OXYGEN SATURATION: 98 % | HEIGHT: 65 IN | WEIGHT: 202 LBS | DIASTOLIC BLOOD PRESSURE: 81 MMHG | SYSTOLIC BLOOD PRESSURE: 131 MMHG | RESPIRATION RATE: 18 BRPM | TEMPERATURE: 97.6 F

## 2018-11-20 DIAGNOSIS — K76.89 LIVER CYST: Primary | ICD-10-CM

## 2018-11-20 NOTE — PROGRESS NOTES
[address] [facility], [place], [place] [facility], [place], [place]       ZACHARIAH Sykes PA-C Thedore Cohen, RODRIGUEZP-BC   ZACHARIAH Sy NP [address] [facility] [address]    at [facility]    [address] [facility], [address], [facility] [address].    [phone]    FAX: [address]    [facility]    [address], [address] [place] [address], [address]    [phone]    FAX: [phone]       Patient Care Team:  Adam Littlejohn DO as PCP - General (Internal Medicine)  Sergio Ayala MD (Rheumatology)  Rosa Mcfadden MD (Neurology)  Kaylah Alves NP (Nurse Practitioner)      Problem List  Date Reviewed: 10/6/2018          Codes Class Noted    Post-menopausal, sp harriet bso last procedure 2006 ICD-10-CM: Z78.0  ICD-9-CM: V49.81  8/9/2018        OAB (overactive bladder) ICD-10-CM: N32.81  ICD-9-CM: 596.51  8/8/2018        Renal cyst, left (simple mri [id number]) ICD-10-CM: N28.1  ICD-9-CM: 753.10  8/8/2018        Hx of solid right renal lesion by [address] [address], negative MR [id number] ICD-10-CM: R93.89  ICD-9-CM: 793.5  8/8/2018        Mixed incontinence ICD-10-CM: N39.46  ICD-9-CM: 788.33  8/8/2018        Incomplete bladder emptying ICD-10-CM: R33.9  ICD-9-CM: 788.21  8/8/2018        Trigger finger of left thumb ICD-10-CM: M65.312  ICD-9-CM: 727.03  5/21/2018        Fibromyalgia ICD-10-CM: M79.7  ICD-9-CM: 729.1  3/18/2018        Chronic pain syndrome ICD-10-CM: G89.4  ICD-9-CM: 338.4  3/18/2018        Liver cyst ICD-10-CM: K76.89  ICD-9-CM: 573.8  3/18/2018        Anxiety and depression ICD-10-CM: F41.9, F32.9  ICD-9-CM: 300.00, 311  3/18/2018        Migraine with vertigo ICD-10-CM: G43.109  ICD-9-CM: 346.80, 780.4  3/18/2018        Migraine headache ICD-10-CM: E77.878  ICD-9-CM: 346.90  3/18/2018 S/P IVY (total abdominal hysterectomy) ICD-10-CM: Z90.710  ICD-9-CM: V88.01  3/18/2018              Brad Falk returns to the [address] for management of cystic liver disease. The active problem list, all pertinent past medical history, medications, radiologic findings and laboratory findings related to the liver disorder were reviewed with the patient. The patient is a 52 y.o. Black female who was found to have cysts in her liver after she developed RUQ pain. She was living in [place] at that time, was seen at [facility] and underwent right hepatic resection in 2004. She says that about 6 months after the surgery the RUQ pain returned and imaging studies demonstrated that the cysts had returned. The most recent MRI was from [facility] in 5/2018. The results demonstrated no change from prior exams. The most recent laboratory studies indicate that the liver transaminases are normal, ALP is normal, tests of hepatic synthetic and metabolic function are normal, and the platelet count is normal.    The patient notes fatigue, pain in the right side over the liver, swelling of the lower extremities, pain in joints and she also complains of pain all over and says she generally feels horrible and cannot function normally. The patient has not experienced swelling of the abdomen, hematemesis, hematochezia. The patient has limitations in functional activities secondary to other medical problems that are not related to the liver disease.         ALLERGIES  Allergies   Allergen Reactions    Flavoring Agent Palpitations    Allergen Xt Tree Garrett-Aust Pine Other (comments)    Beef Containing Products Nausea Only and Myalgia    Caffeine Nausea Only    Cedarwood Other (comments)     Not allergic to cedarwood    Chocolate Flavor Myalgia    Codeine Nausea Only    Duloxetine Other (comments)     Other reaction(s): gi distress  Patient states it makes her really sick and causes her to vomit    Hydrocodone Other (comments)    Pegademase Bovine Other (comments)     nausea    Pine Nut Photosensitivity     Pine tree    Poractant Alcides Other (comments)    Pork/Porcine Containing Products Nausea and Vomiting    Prednisone Other (comments) and Myalgia     Other reaction(s): other/intolerance  Pt states makes heart flutter and liver jump  Local pain with steroid injection and some nausea       MEDICATIONS  Current Outpatient Medications   Medication Sig    calcium carb/vit D3/minerals (CALCIUM CARBONATE-VIT D3-MIN) 600 mg (1,500 mg)-400 unit chew Take  by mouth.  diclofenac (VOLTAREN) 1 % gel     gabapentin (NEURONTIN) 300 mg capsule     rizatriptan (MAXALT) 5 mg tablet Take one tablet at onset of headache. May repeat in 2 hours, if needed    FLUoxetine (PROZAC) 40 mg capsule     meloxicam (MOBIC) 15 mg tablet     QUEtiapine (SEROQUEL) 50 mg tablet     fluticasone (FLONASE) 50 mcg/actuation nasal spray 2 Sprays by Nasal route as needed.  cycloSPORINE (RESTASIS) 0.05 % ophthalmic emulsion Administer 1 Drop to both eyes daily.  cholecalciferol (VITAMIN D3) 400 unit tab tablet Take  by mouth daily.  esomeprazole (NEXIUM) 40 mg capsule Take  by mouth daily.  diclofenac EC (VOLTAREN) 75 mg EC tablet Take 75 mg by mouth daily. Indications: OSTEOARTHRITIS     No current facility-administered medications for this visit. SYSTEM REVIEW NOT RELATED TO LIVER DISEASE OR REVIEWED ABOVE:  Constitution systems: occasional  fever, chills, weight gain, weight loss. Eyes: Negative for visual changes. ENT: Negative for sore throat, painful swallowing. Respiratory: + cough, hemoptysis, SOB. Cardiology: Negative for chest pain, palpitations. GI:  + constipation or diarrhea. : + urinary frequency, dysuria, nocturia. Skin: Negative for rash. Hematology: + easy bruising, blood clots. Musculo-skelatal: + back pain, muscle pain, weakness.   Neurologic: headaches, dizziness, vertigo, memory problems not related to HE. Psychology: + anxiety, + depression. FAMILY HISTORY:  The father  of COPD. The mother had cysts in her liver was on dialysis and  of liver disease     SOCIAL HISTORY:  The patient is . The patient has 1 child and 2 grandchildren. The patient has never used tobacco products. The patient has never consumed significant amounts of alcohol. The patient is currently receiving disability. PHYSICAL EXAMINATION:  Visit Vitals  /81 (BP 1 Location: Right arm, BP Patient Position: Sitting)   Pulse 77   Temp 97.6 °F (36.4 °C) (Tympanic)   Resp 18   Ht 5' 5\" (1.651 m)   Wt 202 lb (91.6 kg)   SpO2 98%   BMI 33.61 kg/m²     General: No acute distress. Eyes: Sclera anicteric. ENT: No oral lesions. Thyroid normal.  Nodes: No adenopathy. Skin: No spider angiomata. No jaundice. No palmar erythema. Respiratory: Lungs clear to auscultation. Cardiovascular: Regular heart rate. No murmurs. No JVD. Abdomen: Soft non-tender. Liver size normal to percussion/palpation. Spleen not palpable. No obvious ascites. Extremities: No edema. No muscle wasting. No gross arthritic changes. Neurologic: Alert and oriented. Cranial nerves grossly intact. No asterixis.     LABORATORY STUDIES:  Liver [place] of [address] Units 10/9/2018 3/14/2018   WBC 4.6 - 13.2 K/uL 8.0 7.8   ANC 1.8 - 8.0 K/UL 4.6 4.5   HGB 12.0 - 16.0 g/dL 13.0 12.9    - 420 K/uL 257 278   INR 0.8 - 1.2    1.0   AST 15 - 37 U/L 15 15   ALT 13 - 56 U/L 22 19   Alk Phos 45 - 117 U/L 111 104   Bili, Total 0.2 - 1.0 MG/DL 0.4 0.3   Bili, Direct 0.0 - 0.2 MG/DL 0.1 0.1   Albumin 3.4 - 5.0 g/dL 3.6 3.5   BUN 7.0 - 18 MG/DL 9 11   Creat 0.6 - 1.3 MG/DL 0.73 0.86   Na 136 - 145 mmol/L 141 145   K 3.5 - 5.5 mmol/L 4.2 4.4   Cl 100 - 108 mmol/L 105 107   CO2 21 - 32 mmol/L 27 30   Glucose 74 - 99 mg/dL 83 93   Magnesium 1.8 - 2.4 mg/dL       Cancer Screening Latest Ref Rng & Units 3/14/2018   AFP, Serum 0.0 - 8.0 ng/mL 1.7   AFP-L3% 0.0 - 9.9 % Comment   CA 19-9 0 - 35 U/mL 14   CEA ng/mL <0.5     SEROLOGIES:  Serologies Latest Ref Rng & Units 3/14/2018   Hep A Ab, Total NEGATIVE   NEGATIVE   Hep B Surface Ag <1.00 Index 0.1   Hep B Surface Ag Interp NEG   NEGATIVE   Hep B Core Ab, Total NEGATIVE   NEGATIVE   Hep B Surface Ab >10.0 mIU/mL <3.10 (L)   Hep B Surface Ab Interp POS   NEGATIVE (A)   Hep C Ab 0.0 - 0.9 s/co ratio 0.2     LIVER HISTOLOGY:  Not available or performed    ENDOSCOPIC PROCEDURES:  Not available or performed    RADIOLOGY:  1/2018. MRI abdomen. Evidence of right lobe resection. Cysts in the right and left lobe measuring 3-5 cm. Several smaller cysts. 2 hemangiomas in the spleen 3.5 and 2.6 cm. Several small cysts in the spleen. Left renal cyst 7 cm. No cysts in the pancreas. 5/2018. MRI @ [facility]. No change to prior exam.     OTHER TESTING:  Not available or performed    ASSESSMENT AND PLAN:  Cystic liver disease which does not meet the criteria for polycystic liver. The cysts in the liver are small measuring only 3-5 cm and it is difficult to know if they are truly causing her RUQ pain. The largest cyst in the left lobe would be difficult to aspirate and require the aspiration needle pass through the diaphram risking pneumothorax. The cysts are benign and not the cause of her diffuse body pain or chronic pain syndrome. Assured patient that liver cysts do not become infected. Hepatic resection can be done for single liver cysts but multiple cysts generally recur following resection as the liver regenerates. Have performed laboratory testing to monitor liver function and degree of liver injury. This included BMP, hepatic panel, CBC with platelet count.      Liver transaminases are normal.  Alkaline phosphatase is normal.  Liver function is normal.  The platelet count is normal.      Laboratory studies and imaging suggest the patient has no evidence of liver disease. Serologic studies for viral hepatitis were negative. Cancer markers were all negative. This included AFP, CA 19-9, CEA. There are no contraindications for the patient to take any medications that are necessary for treatment of other medical issues. Vaccination for viral hepatitis A and B is recommended since the patient has no serologic evidence of previous exposure or vaccination with immunity. Discussed with patient that routine monitoring with dynamic MRI is not necessary due to the fact that cysts are benign. FOLLOW-UP:  All of the issues listed above in the Assessment and Plan were discussed with the patient. All questions were answered. The patient expressed a clear understanding of the above. No follow-up at [address] is needed. Again recommended patient be seen for evaluation at GI for possible IBS.          Elvera Hodgkins, AGPCNP-BC    [address], [address] [address] Kylie Henson, [address]  [phone]  [address]

## 2018-11-20 NOTE — PROGRESS NOTES
Jose Angel Aguilar is a 52 y.o. female      1. Have you been to the ER, urgent care clinic or hospitalized since your last visit? NO.     2. Have you seen or consulted any other health care providers outside of the [address] [place] [address] since your last visit (Include any pap smears or colon screening)? YES    Patient has been to pcp since last visit for routine follow ups.      Learning Assessment 3/14/2018   PRIMARY LEARNER Patient   BARRIERS PRIMARY LEARNER NONE   CO-LEARNER CAREGIVER No   PRIMARY LANGUAGE ENGLISH   LEARNER PREFERENCE PRIMARY LISTENING   ANSWERED BY patient   RELATIONSHIP SELF

## (undated) DEVICE — KENDALL SCD EXPRESS SLEEVES, KNEE LENGTH, MEDIUM: Brand: KENDALL SCD

## (undated) DEVICE — GOWN,SIRUS,NONRNF,SETINSLV,2XL,18/CS: Brand: MEDLINE

## (undated) DEVICE — DRAPE TWL SURG 16X26IN BLU ORB04] ALLCARE INC]

## (undated) DEVICE — UNDERCAST PADDING: Brand: DEROYAL

## (undated) DEVICE — STERILE POLYISOPRENE POWDER-FREE SURGICAL GLOVES: Brand: PROTEXIS

## (undated) DEVICE — STERILE POLYISOPRENE POWDER-FREE SURGICAL GLOVES WITH EMOLLIENT COATING: Brand: PROTEXIS

## (undated) DEVICE — SPONGE GZ W4XL4IN COT 12 PLY TYP VII WVN C FLD DSGN

## (undated) DEVICE — PACK PROCEDURE SURG EXTREMITY CUST

## (undated) DEVICE — DEVON™ KNEE AND BODY STRAP 60" X 3" (1.5 M X 7.6 CM): Brand: DEVON

## (undated) DEVICE — REM POLYHESIVE ADULT PATIENT RETURN ELECTRODE: Brand: VALLEYLAB

## (undated) DEVICE — SUT ETHLN 3-0 18IN PS2 BLK --

## (undated) DEVICE — PREP CHLORAPREP 10.5 ML ORG --

## (undated) DEVICE — BANDAGE COMPR W3XL186IN SYNTH KNIT DSGN COHESIVE ELAS ECON

## (undated) DEVICE — SKIN MARKER,REGULAR TIP WITH RULER AND LABELS: Brand: DEVON